# Patient Record
Sex: FEMALE | Race: OTHER | NOT HISPANIC OR LATINO | URBAN - METROPOLITAN AREA
[De-identification: names, ages, dates, MRNs, and addresses within clinical notes are randomized per-mention and may not be internally consistent; named-entity substitution may affect disease eponyms.]

---

## 2017-02-26 ENCOUNTER — EMERGENCY (EMERGENCY)
Facility: HOSPITAL | Age: 32
LOS: 1 days | Discharge: HOME | End: 2017-02-26
Admitting: EMERGENCY MEDICINE
Payer: COMMERCIAL

## 2017-02-26 PROCEDURE — 99284 EMERGENCY DEPT VISIT MOD MDM: CPT | Mod: 25

## 2017-02-28 PROCEDURE — 96375 TX/PRO/DX INJ NEW DRUG ADDON: CPT

## 2017-02-28 PROCEDURE — 83690 ASSAY OF LIPASE: CPT

## 2017-02-28 PROCEDURE — 85027 COMPLETE CBC AUTOMATED: CPT

## 2017-02-28 PROCEDURE — 96361 HYDRATE IV INFUSION ADD-ON: CPT

## 2017-02-28 PROCEDURE — 81003 URINALYSIS AUTO W/O SCOPE: CPT

## 2017-02-28 PROCEDURE — 81025 URINE PREGNANCY TEST: CPT

## 2017-02-28 PROCEDURE — 96374 THER/PROPH/DIAG INJ IV PUSH: CPT

## 2017-02-28 PROCEDURE — 80053 COMPREHEN METABOLIC PANEL: CPT

## 2017-02-28 PROCEDURE — 99284 EMERGENCY DEPT VISIT MOD MDM: CPT | Mod: 25

## 2017-03-07 ENCOUNTER — APPOINTMENT (OUTPATIENT)
Dept: OBGYN | Facility: CLINIC | Age: 32
End: 2017-03-07

## 2017-03-07 VITALS
BODY MASS INDEX: 18.61 KG/M2 | DIASTOLIC BLOOD PRESSURE: 72 MMHG | WEIGHT: 109 LBS | HEIGHT: 64 IN | SYSTOLIC BLOOD PRESSURE: 120 MMHG

## 2017-03-10 LAB — BACTERIA UR CULT: NORMAL

## 2017-05-01 ENCOUNTER — APPOINTMENT (OUTPATIENT)
Dept: PSYCHIATRY | Facility: CLINIC | Age: 32
End: 2017-05-01

## 2017-05-01 DIAGNOSIS — G43.909 MIGRAINE, UNSPECIFIED, NOT INTRACTABLE, W/OUT STATUS MIGRAINOSUS: ICD-10-CM

## 2017-05-21 ENCOUNTER — RX RENEWAL (OUTPATIENT)
Age: 32
End: 2017-05-21

## 2017-06-06 ENCOUNTER — APPOINTMENT (OUTPATIENT)
Dept: OBGYN | Facility: CLINIC | Age: 32
End: 2017-06-06

## 2017-06-06 VITALS
BODY MASS INDEX: 19.63 KG/M2 | HEIGHT: 64 IN | WEIGHT: 115 LBS | SYSTOLIC BLOOD PRESSURE: 120 MMHG | DIASTOLIC BLOOD PRESSURE: 82 MMHG

## 2017-06-06 DIAGNOSIS — B37.3 CANDIDIASIS OF VULVA AND VAGINA: ICD-10-CM

## 2017-06-06 DIAGNOSIS — Z87.898 PERSONAL HISTORY OF OTHER SPECIFIED CONDITIONS: ICD-10-CM

## 2017-06-06 DIAGNOSIS — N64.4 MASTODYNIA: ICD-10-CM

## 2017-06-06 DIAGNOSIS — K62.89 OTHER SPECIFIED DISEASES OF ANUS AND RECTUM: ICD-10-CM

## 2017-06-06 DIAGNOSIS — Z30.9 ENCOUNTER FOR CONTRACEPTIVE MANAGEMENT, UNSPECIFIED: ICD-10-CM

## 2017-06-08 PROBLEM — B37.3 YEAST VAGINITIS: Status: RESOLVED | Noted: 2017-03-07 | Resolved: 2017-06-08

## 2017-06-08 PROBLEM — K62.89 RECTAL BURNING: Status: RESOLVED | Noted: 2017-03-07 | Resolved: 2017-06-08

## 2017-06-08 LAB — HPV HIGH+LOW RISK DNA PNL CVX: NEGATIVE

## 2017-06-08 RX ORDER — TERCONAZOLE 8 MG/G
0.8 CREAM VAGINAL
Qty: 3 | Refills: 0 | Status: DISCONTINUED | COMMUNITY
Start: 2017-03-07 | End: 2017-06-08

## 2017-06-08 RX ORDER — HYDROCORTISONE ACETATE 1 %
50 OINTMENT (GRAM) RECTAL
Qty: 1 | Refills: 0 | Status: DISCONTINUED | COMMUNITY
Start: 2017-03-07 | End: 2017-06-08

## 2017-06-08 RX ORDER — VENLAFAXINE HYDROCHLORIDE 37.5 MG/1
37.5 CAPSULE, EXTENDED RELEASE ORAL DAILY
Qty: 30 | Refills: 2 | Status: DISCONTINUED | COMMUNITY
Start: 2017-05-01 | End: 2017-06-08

## 2017-06-08 RX ORDER — NITROFURANTOIN (MONOHYDRATE/MACROCRYSTALS) 25; 75 MG/1; MG/1
100 CAPSULE ORAL
Qty: 14 | Refills: 0 | Status: DISCONTINUED | COMMUNITY
Start: 2017-03-07 | End: 2017-06-08

## 2017-06-10 LAB — CYTOLOGY CVX/VAG DOC THIN PREP: NORMAL

## 2017-12-12 ENCOUNTER — APPOINTMENT (OUTPATIENT)
Dept: OBGYN | Facility: CLINIC | Age: 32
End: 2017-12-12
Payer: COMMERCIAL

## 2017-12-12 VITALS
BODY MASS INDEX: 21 KG/M2 | DIASTOLIC BLOOD PRESSURE: 72 MMHG | HEIGHT: 64 IN | SYSTOLIC BLOOD PRESSURE: 108 MMHG | WEIGHT: 123 LBS

## 2017-12-12 PROCEDURE — 99213 OFFICE O/P EST LOW 20 MIN: CPT

## 2018-06-05 ENCOUNTER — APPOINTMENT (OUTPATIENT)
Dept: OBGYN | Facility: CLINIC | Age: 33
End: 2018-06-05
Payer: COMMERCIAL

## 2018-06-05 VITALS
SYSTOLIC BLOOD PRESSURE: 124 MMHG | HEIGHT: 64 IN | DIASTOLIC BLOOD PRESSURE: 84 MMHG | WEIGHT: 118 LBS | BODY MASS INDEX: 20.14 KG/M2

## 2018-06-05 PROCEDURE — 99395 PREV VISIT EST AGE 18-39: CPT

## 2018-06-06 LAB — HPV HIGH+LOW RISK DNA PNL CVX: NOT DETECTED

## 2018-06-11 LAB — CYTOLOGY CVX/VAG DOC THIN PREP: NORMAL

## 2018-06-19 ENCOUNTER — RX RENEWAL (OUTPATIENT)
Age: 33
End: 2018-06-19

## 2018-07-18 ENCOUNTER — TRANSCRIPTION ENCOUNTER (OUTPATIENT)
Age: 33
End: 2018-07-18

## 2018-11-19 ENCOUNTER — RX RENEWAL (OUTPATIENT)
Age: 33
End: 2018-11-19

## 2019-02-07 ENCOUNTER — TRANSCRIPTION ENCOUNTER (OUTPATIENT)
Age: 34
End: 2019-02-07

## 2019-02-07 RX ORDER — VITAMIN C, CALCIUM, IRON, VITAMIN D3, VITAMIN E, THIAMIN, RIBOFLAVIN, NIACINAMIDE, VITAMIN B6, FOLIC ACID, IODINE, ZINC, COPPER, DOCUSATE SODIUM
27-1 & 250 KIT
Qty: 1 | Refills: 11 | Status: DISCONTINUED | COMMUNITY
Start: 2018-06-05 | End: 2019-02-07

## 2019-03-05 ENCOUNTER — APPOINTMENT (OUTPATIENT)
Dept: OBGYN | Facility: CLINIC | Age: 34
End: 2019-03-05
Payer: COMMERCIAL

## 2019-03-05 VITALS
WEIGHT: 115 LBS | DIASTOLIC BLOOD PRESSURE: 58 MMHG | BODY MASS INDEX: 19.63 KG/M2 | HEIGHT: 64 IN | SYSTOLIC BLOOD PRESSURE: 124 MMHG

## 2019-03-05 LAB
BILIRUB UR QL STRIP: NORMAL
GLUCOSE UR-MCNC: NORMAL
HCG UR QL: 0.2 EU/DL
HCG UR QL: POSITIVE
HGB UR QL STRIP.AUTO: NORMAL
KETONES UR-MCNC: NORMAL
LEUKOCYTE ESTERASE UR QL STRIP: NORMAL
NITRITE UR QL STRIP: NORMAL
PH UR STRIP: 7
PROT UR STRIP-MCNC: NORMAL
QUALITY CONTROL: YES
SP GR UR STRIP: 1.01

## 2019-03-05 PROCEDURE — 76817 TRANSVAGINAL US OBSTETRIC: CPT

## 2019-03-05 PROCEDURE — 99213 OFFICE O/P EST LOW 20 MIN: CPT | Mod: 25

## 2019-03-05 PROCEDURE — 36415 COLL VENOUS BLD VENIPUNCTURE: CPT

## 2019-03-05 PROCEDURE — 81025 URINE PREGNANCY TEST: CPT

## 2019-03-05 RX ORDER — PANTOPRAZOLE 40 MG/1
40 TABLET, DELAYED RELEASE ORAL DAILY
Qty: 30 | Refills: 0 | Status: DISCONTINUED | COMMUNITY
Start: 2017-06-08 | End: 2019-03-05

## 2019-03-05 RX ORDER — VENLAFAXINE HYDROCHLORIDE 75 MG/1
75 CAPSULE, EXTENDED RELEASE ORAL DAILY
Qty: 30 | Refills: 2 | Status: DISCONTINUED | COMMUNITY
Start: 2017-05-01 | End: 2019-03-05

## 2019-03-07 LAB
ABO + RH PNL BLD: NORMAL
BACTERIA UR CULT: NORMAL
BLD GP AB SCN SERPL QL: NORMAL
C TRACH RRNA SPEC QL NAA+PROBE: NOT DETECTED
HCG SERPL-MCNC: 1952 MIU/ML
N GONORRHOEA RRNA SPEC QL NAA+PROBE: NOT DETECTED
PROGEST SERPL-MCNC: 0.9 NG/ML
SOURCE AMPLIFICATION: NORMAL
SOURCE AMPLIFICATION: NORMAL
T VAGINALIS RRNA SPEC QL NAA+PROBE: NOT DETECTED

## 2019-03-08 LAB — HCG SERPL-MCNC: 753 MIU/ML

## 2019-03-09 ENCOUNTER — TRANSCRIPTION ENCOUNTER (OUTPATIENT)
Age: 34
End: 2019-03-09

## 2019-03-11 ENCOUNTER — APPOINTMENT (OUTPATIENT)
Dept: OBGYN | Facility: CLINIC | Age: 34
End: 2019-03-11

## 2019-03-11 ENCOUNTER — APPOINTMENT (OUTPATIENT)
Dept: OBGYN | Facility: CLINIC | Age: 34
End: 2019-03-11
Payer: COMMERCIAL

## 2019-03-11 VITALS
SYSTOLIC BLOOD PRESSURE: 100 MMHG | WEIGHT: 114 LBS | BODY MASS INDEX: 19.46 KG/M2 | HEIGHT: 64 IN | DIASTOLIC BLOOD PRESSURE: 50 MMHG

## 2019-03-11 LAB
BILIRUB UR QL STRIP: NORMAL
GLUCOSE UR-MCNC: NORMAL
HCG UR QL: 0.2 EU/DL
HGB UR QL STRIP.AUTO: NORMAL
KETONES UR-MCNC: NORMAL
LEUKOCYTE ESTERASE UR QL STRIP: NORMAL
NITRITE UR QL STRIP: NORMAL
PH UR STRIP: 7.5
PROT UR STRIP-MCNC: NORMAL
SP GR UR STRIP: 1.01

## 2019-03-11 PROCEDURE — 76817 TRANSVAGINAL US OBSTETRIC: CPT

## 2019-03-12 LAB — HCG SERPL-MCNC: 187 MIU/ML

## 2019-03-12 NOTE — REVIEW OF SYSTEMS
[Pelvic Pain] : pelvic pain [Abn Vag Bleeding] : abnormal vaginal bleeding [Nl] : Hematologic/Lymphatic

## 2019-03-12 NOTE — PHYSICAL EXAM
Patient Education     * Viral Conjunctivitis (Child)  Viral conjunctivitis (sometimes called pink eye) is a common infection of the eye. It is very contagious. The most common symptoms include redness, discharge from the eye, swollen eyelids, and a gritty or scratchy feeling in the eye.  Viral conjunctivitis is caused by a virus. It may be treated with medicine. Viral conjunctivitis is very contagious. Touching the infected eye, then touching another person passes this infection. It can also be spread from one eye to the other in this same way. Children with this illness may go to school, as long as they are not feeling ill and can avoid close contact with others.  Home care  Your child s healthcare provider may prescribe eye drops or an ointment. These may or may not contain antiviral medicine to treat the infection. You may also be told to use artificial tears to help soothe the irritation. Follow all instructions when using these medicines.  To give eye medicine to a child  1.   Wash your hands well with soap and warm water.  2. Remove any drainage from your child s eye with a clean tissue. Wipe from the nose toward the ear, to keep the eye as clean as possible.  3. To remove eye crusts, wet a washcloth with warm water and place it over the eye. Wait about 1 minute. Gently wipe the eye from the nose outward with the washcloth. Do this until the eye is clear. Important: If both eyes need cleaning, use a separate cloth for each eye.  4. Have your child lie down on a flat surface. A rolled-up towel or pillow may be placed under the neck so that the head is tilted back. Gently hold your child s head, if needed.  5. Using eye drops: Apply drops in the corner of the eye where the eyelid meets the nose. The drops will pool in this area. When your child blinks or opens his or her lids, the drops will flow into the eye. Give the exact number of drops prescribed. Be careful not to touch the eye or eyelashes with the  dropper.  6. Using ointment: If both drops and ointment are prescribed, give the drops first. Wait 3 minutes, and then apply the ointment. Doing this will give each medicine time to work. To apply the ointment, start by gently pulling down the lower lid. Place a thin line of ointment along the inside of the lid. Begin at the nose and move outward. Close the lid. Wipe away excess ointment from the nose area outward. This is to keep the eyes as clean as possible. Have your child keep the eye closed for 1 or 2 minutes so the medication has time to coat the eye. Eye ointment may cause blurry vision. This is normal. Apply ointment right before your child goes to sleep. In infants, ointment may be easier to apply while your child is sleeping.  7. Wash your hands well with soap and warm water again. This is to help prevent the infection from spreading.  General care    Apply a damp, cool washcloth to the eye as needed to help ease pain and irritation.    Make sure your child doesn t rub his or her eyes.    Shield your child s eyes when in direct sunlight to avoid irritation.  Follow-up care  Follow up with your child s healthcare provider, or as advised.  Special note to parents  To avoid spreading the infection, wash your hands well with soap and warm water before and after touching your child s eyes. Have your child wash his or her hands often. Make sure your child doesn t touch his or her eyes. Dispose of all tissues. Launder washcloths after each use. Don t let your child share towels, bedding, or clothes with anyone.  When to seek medical advice  Unless your child's healthcare provider advises otherwise, call the provider right away if any of these occur:    Your child is 3 months old or younger and has a fever of 100.4 F (38 C) or higher. Get medical care right away. Fever in a young baby can be a sign of a dangerous infection.    Your child is younger than 2 years of age and has a fever of 100.4 F (38 C) that  continues for more than 1 day    Your child is 2 years old or older and has a fever of 100.4 F (38 C) that continues for more than 3 days    Your child is of any age and has repeated fevers above 104 F (40 C)    Your child has vision changes, such as trouble seeing    Your child shows signs of the infection getting worse, such as more warmth, redness, swelling, or fluid leaking from the eye    Your child s pain gets worse. Babies may show pain as crying or fussing that can t be soothed    Swelling and redness don t get better with treatment  Call 911  Call 911 if your child has any of these:    Trouble breathing    Confusion    Extreme drowsiness or trouble awakening    Fainting or loss of consciousness    Rapid heart rate    Seizure    Stiff neck  Date Last Reviewed: 6/15/2015    [id number] [employer]. [address] [place] [id number]. All rights reserved. This information is not intended as a substitute for professional medical care. Always follow your healthcare professional's instructions.           Patient Education     Viral Upper Respiratory Illness (Child)  Your child has a viral upper respiratory illness (URI), which is another term for the common cold. The virus is contagious during the first few days. It is spread through the air by coughing, sneezing, or by direct contact (touching your sick child then touching your own eyes, nose, or mouth). Frequent handwashing will decrease risk of spread. Most viral illnesses resolve within 7 to 14 days with rest and simple home remedies. However, they may sometimes last up to 4 weeks. Antibiotics will not kill a virus and are generally not prescribed for this condition.    Home care    Fluids. Fever increases water loss from the body. Encourage your child to drink lots of fluids to loosen lung secretions and make it easier to breathe.   ? For infants under 1 year old, continue regular formula or breast feedings. Between feedings, give oral  rehydration solution. This is available from drugstores and grocery stores without a prescription.  ?  For children over 1 year old, give plenty of fluids, such as water, juice, gelatin water, soda without caffeine, ginger ale, lemonade, or ice pops.    Eating. If your child doesn't want to eat solid foods, it's OK for a few days, as long as he or she drinks lots of fluid.    Rest. Keep children with fever at home resting or playing quietly until the fever is gone. Encourage frequent naps. Your child may return to day care or school when the fever is gone and he or she is eating well, does not tire easily, and is feeling better.    Sleep. Periods of sleeplessness and irritability are common. A congested child will sleep best with the head and upper body propped up on pillows or with the head of the bed frame raised on a 6-inch block.     Cough. Coughing is a normal part of this illness. A cool mist humidifier at the bedside may be helpful. Be sure to clean the humidifier every day to prevent mold. Over-the-counter cough and cold medicines have not proved to be any more helpful than a placebo (syrup with no medicine in it). In addition, these medicines can produce serious side effects, especially in infants under 2 years of age. Don't give over-the-counter cough and cold medicines to children under 6 years unless your healthcare provider has specifically advised you to do so.  ? Don t expose your child to cigarette smoke. It can make the cough worse. Don't let anyone smoke in your house or car.    Nasal congestion. Suction the nose of infants with a bulb syringe. You may put 2 to 3 drops of saltwater (saline) nose drops in each nostril before suctioning. This helps thin and remove secretions. Saline nose drops are available without a prescription. You can also use 1/4 teaspoon of table salt dissolved in 1 cup of water.    Fever. Use children s acetaminophen for fever, fussiness, or discomfort, unless another medicine  was prescribed. In infants over 6 months of age, you may use children s ibuprofen or acetaminophen. If your child has chronic liver or kidney disease or has ever had a stomach ulcer or gastrointestinal bleeding, talk with your healthcare provider before using these medicines. Aspirin should never be given to anyone younger than 18 years of age who is ill with a viral infection or fever. It may cause severe liver or brain damage.    Preventing spread. Washing your hands before and after touching your sick child will help prevent a new infection. It will also help prevent the spread of this viral illness to yourself and other children. In an age appropriate manner, teach your children when, how, and why to wash their hands. Role model correct hand washing and encourage adults in your home to wash hands frequently.  Follow-up care  Follow up with your healthcare provider, or as advised.  When to seek medical advice  For a usually healthy child, call your child's healthcare provider right away if any of these occur:    A fever (see Fever and children, below)    Earache, sinus pain, stiff or painful neck, headache, repeated diarrhea, or vomiting.    Unusual fussiness.    A new rash appears.    Your child is dehydrated, with one or more of these symptoms:  ? No tears when crying.  ?  Sunken  eyes or a dry mouth.  ? No wet diapers for 8 hours in infants.  ? Reduced urine output in older children.    Your child has new symptoms or you are worried or confused by your child's condition.  Call 911  Call 911 if any of these occur:    Increased wheezing or difficulty breathing    Unusual drowsiness or confusion    Fast breathing:  ? Birth to 6 weeks: over 60 breaths per minute  ? 6 weeks to 2 years: over 45 breaths per minute  ? 3 to 6 years: over 35 breaths per minute  ? 7 to 10 years: over 30 breaths per minute  ? Older than 10 years: over 25 breaths per minute  Fever and children  Always use a digital thermometer to check  [Normal] : uterus your child s temperature. Never use a mercury thermometer.  For infants and toddlers, be sure to use a rectal thermometer correctly. A rectal thermometer may accidentally poke a hole in (perforate) the rectum. It may also pass on germs from the stool. Always follow the product maker s directions for proper use. If you don t feel comfortable taking a rectal temperature, use another method. When you talk to your child s healthcare provider, tell him or her which method you used to take your child s temperature.  Here are guidelines for fever temperature. Ear temperatures aren t accurate before 6 months of age. Don t take an oral temperature until your child is at least 4 years old.  Infant under 3 months old:    Ask your child s healthcare provider how you should take the temperature.    Rectal or forehead (temporal artery) temperature of 100.4 F (38 C) or higher, or as directed by the provider    Armpit temperature of 99 F (37.2 C) or higher, or as directed by the provider  Child age 3 to 36 months:    Rectal, forehead (temporal artery), or ear temperature of 102 F (38.9 C) or higher, or as directed by the provider    Armpit temperature of 101 F (38.3 C) or higher, or as directed by the provider  Child of any age:    Repeated temperature of 104 F (40 C) or higher, or as directed by the provider    Fever that lasts more than 24 hours in a child under 2 years old. Or a fever that lasts for 3 days in a child 2 years or older.   Date Last Reviewed: 2018 2000-2018 [employer]. [address] [place] [address], [place] [address]. All rights reserved. This information is not intended as a substitute for professional medical care. Always follow your healthcare professional's instructions.           Patient Education     Treating Viral Respiratory Illness in Children  Viral respiratory illnesses include colds, the flu, and RSV (respiratory syncytial virus). Treatment will focus on relieving your child s symptoms  [No Bleeding] : there was no active vaginal bleeding and ensuring that the infection does not get worse. Antibiotics are not effective against viruses. Always see your child s healthcare provider if your child has trouble breathing.    Helping your child feel better    Give your child plenty of fluids, such as water or apple juice.    Make sure your child gets plenty of rest.    Keep your infant s nose clear. Use a rubber bulb suction device to remove mucus as needed. Don't be aggressive when suctioning. This may cause more swelling and discomfort.    Raise the head of your child's bed slightly to make breathing easier.    Run a cool-mist humidifier or vaporizer in your child s room to keep the air moist and nasal passages clear.    Don't let anyone smoke near your child.    Treat your child s fever with acetaminophen. In infants 6 months or older, you may use ibuprofen instead to help reduce the fever. Never give aspirin to a child under age 18. It could cause a rare but serious condition called Reye syndrome.  When to seek medical care  Most children get over colds and flu on their own in time, with rest and care from you. Call your child's healthcare provider if your child:    Has a fever of 100.4 F (38 C) in a baby younger than 3 months    Has a repeated fever of 104 F (40 C) or higher    Has nausea or vomiting, or can t keep even small amounts of liquid down    Hasn t urinated for 6 hours or more, or has dark or strong-smelling urine    Has a harsh cough, a cough that doesn't get better, wheezing, or trouble breathing    Has bad or increasing pain    Develops a skin rash    Is very tired or lethargic    Develops a blue color to the skin around the lips or on the fingers or toes  Date Last Reviewed: 1/1/2017 2000-2018 [employer]. [address] [place] [address], [place] [address]. All rights reserved. This information is not intended as a substitute for professional medical care. Always follow your healthcare professional's instructions.            [Uterine Adnexae] : were not tender and not enlarged

## 2019-03-26 LAB — HCG SERPL-MCNC: 7 MIU/ML

## 2019-04-11 ENCOUNTER — APPOINTMENT (OUTPATIENT)
Dept: OBGYN | Facility: CLINIC | Age: 34
End: 2019-04-11
Payer: COMMERCIAL

## 2019-04-11 PROCEDURE — 99211 OFF/OP EST MAY X REQ PHY/QHP: CPT

## 2019-04-15 ENCOUNTER — APPOINTMENT (OUTPATIENT)
Dept: OBGYN | Facility: CLINIC | Age: 34
End: 2019-04-15
Payer: COMMERCIAL

## 2019-04-15 VITALS
DIASTOLIC BLOOD PRESSURE: 70 MMHG | HEIGHT: 63 IN | SYSTOLIC BLOOD PRESSURE: 110 MMHG | BODY MASS INDEX: 20.73 KG/M2 | WEIGHT: 117 LBS

## 2019-04-15 LAB — BACTERIA UR CULT: NORMAL

## 2019-04-15 PROCEDURE — 99213 OFFICE O/P EST LOW 20 MIN: CPT

## 2019-04-15 RX ORDER — MISOPROSTOL 200 UG/1
200 TABLET ORAL
Qty: 4 | Refills: 0 | Status: DISCONTINUED | COMMUNITY
Start: 2019-03-11 | End: 2019-04-15

## 2019-04-15 NOTE — PHYSICAL EXAM
[No Lesions] : no genitalia lesions [Normal] : uterus [Labia Minora] : labia minora [Discharge] : a  ~M vaginal discharge was present [Moderate] : moderate [Usman] : yellow [Thick] : thick [No Bleeding] : there was no active vaginal bleeding [Normal Position] : in a normal position [Uterine Adnexae] : were not tender and not enlarged [Foul Smelling] : not foul smelling [Adnexa Tenderness] : were not tender [Motion Tenderness] : there was no cervical motion tenderness [Ovarian Mass (___ Cm)] : there were no adnexal masses

## 2019-04-15 NOTE — HISTORY OF PRESENT ILLNESS
[Definite:  ___ (Date)] : the last menstrual period was [unfilled] [Normal Amount/Duration] : was of a normal amount and duration [Regular Cycle Intervals] : periods have been regular [Monogamous] : is monogamous [Sexually Active] : is sexually active [Spotting Between  Menses] : no spotting between menses

## 2019-04-15 NOTE — COUNSELING
[Breast Self Exam] : breast self exam [Nutrition] : nutrition [Exercise] : exercise [Vitamins/Supplements] : vitamins/supplements [STD (testing, results, tx)] : STD (testing, results, tx) [Vulvar Hygiene] : vulvar hygiene [Bladder Hygiene] : bladder hygiene

## 2019-04-16 LAB
CANDIDA VAG CYTO: NOT DETECTED
G VAGINALIS+PREV SP MTYP VAG QL MICRO: NOT DETECTED
T VAGINALIS VAG QL WET PREP: NOT DETECTED

## 2019-07-16 ENCOUNTER — RESULT CHARGE (OUTPATIENT)
Age: 34
End: 2019-07-16

## 2019-07-16 ENCOUNTER — APPOINTMENT (OUTPATIENT)
Dept: OBGYN | Facility: CLINIC | Age: 34
End: 2019-07-16
Payer: COMMERCIAL

## 2019-07-16 VITALS
DIASTOLIC BLOOD PRESSURE: 60 MMHG | SYSTOLIC BLOOD PRESSURE: 104 MMHG | BODY MASS INDEX: 20.38 KG/M2 | HEIGHT: 63 IN | WEIGHT: 115 LBS

## 2019-07-16 LAB
BILIRUB UR QL STRIP: NORMAL
GLUCOSE UR-MCNC: NORMAL
HCG UR QL: 0.2 EU/DL
HCG UR QL: POSITIVE
HGB UR QL STRIP.AUTO: NORMAL
KETONES UR-MCNC: NORMAL
LEUKOCYTE ESTERASE UR QL STRIP: NORMAL
NITRITE UR QL STRIP: NORMAL
PH UR STRIP: 6.5
PROT UR STRIP-MCNC: NORMAL
QUALITY CONTROL: YES
SP GR UR STRIP: 1.02

## 2019-07-16 PROCEDURE — 81003 URINALYSIS AUTO W/O SCOPE: CPT | Mod: QW

## 2019-07-16 PROCEDURE — 76817 TRANSVAGINAL US OBSTETRIC: CPT

## 2019-07-16 PROCEDURE — 81025 URINE PREGNANCY TEST: CPT

## 2019-07-16 PROCEDURE — 0501F PRENATAL FLOW SHEET: CPT

## 2019-07-18 ENCOUNTER — NON-APPOINTMENT (OUTPATIENT)
Age: 34
End: 2019-07-18

## 2019-07-18 ENCOUNTER — OTHER (OUTPATIENT)
Age: 34
End: 2019-07-18

## 2019-07-18 DIAGNOSIS — N89.8 OTHER SPECIFIED NONINFLAMMATORY DISORDERS OF VAGINA: ICD-10-CM

## 2019-07-18 DIAGNOSIS — O02.1 MISSED ABORTION: ICD-10-CM

## 2019-07-18 DIAGNOSIS — K21.9 GASTRO-ESOPHAGEAL REFLUX DISEASE W/OUT ESOPHAGITIS: ICD-10-CM

## 2019-07-18 DIAGNOSIS — Z32.01 ENCOUNTER FOR PREGNANCY TEST, RESULT POSITIVE: ICD-10-CM

## 2019-07-18 DIAGNOSIS — R39.9 UNSPECIFIED SYMPTOMS AND SIGNS INVOLVING THE GENITOURINARY SYSTEM: ICD-10-CM

## 2019-07-18 DIAGNOSIS — Z87.59 PERSONAL HISTORY OF OTHER COMPLICATIONS OF PREGNANCY, CHILDBIRTH AND THE PUERPERIUM: ICD-10-CM

## 2019-07-18 DIAGNOSIS — Z30.9 ENCOUNTER FOR CONTRACEPTIVE MANAGEMENT, UNSPECIFIED: ICD-10-CM

## 2019-07-18 DIAGNOSIS — B37.9 CANDIDIASIS, UNSPECIFIED: ICD-10-CM

## 2019-07-18 LAB
BACTERIA UR CULT: NORMAL
C TRACH RRNA SPEC QL NAA+PROBE: NOT DETECTED
N GONORRHOEA RRNA SPEC QL NAA+PROBE: NOT DETECTED
SOURCE AMPLIFICATION: NORMAL
SOURCE AMPLIFICATION: NORMAL
T VAGINALIS RRNA SPEC QL NAA+PROBE: NOT DETECTED

## 2019-07-18 RX ORDER — TERCONAZOLE 8 MG/G
0.8 CREAM VAGINAL
Qty: 1 | Refills: 0 | Status: DISCONTINUED | COMMUNITY
Start: 2019-04-05 | End: 2019-07-18

## 2019-08-06 ENCOUNTER — APPOINTMENT (OUTPATIENT)
Dept: OBGYN | Facility: CLINIC | Age: 34
End: 2019-08-06
Payer: COMMERCIAL

## 2019-08-06 VITALS
HEIGHT: 63 IN | WEIGHT: 118 LBS | BODY MASS INDEX: 20.91 KG/M2 | SYSTOLIC BLOOD PRESSURE: 110 MMHG | DIASTOLIC BLOOD PRESSURE: 60 MMHG

## 2019-08-06 LAB
BILIRUB UR QL STRIP: NORMAL
GLUCOSE UR-MCNC: NORMAL
HCG UR QL: 0.2 EU/DL
HGB UR QL STRIP.AUTO: NORMAL
KETONES UR-MCNC: NORMAL
LEUKOCYTE ESTERASE UR QL STRIP: NORMAL
NITRITE UR QL STRIP: NORMAL
PH UR STRIP: 5.5
PROT UR STRIP-MCNC: NORMAL
SP GR UR STRIP: 1.02

## 2019-08-06 PROCEDURE — 76801 OB US < 14 WKS SINGLE FETUS: CPT

## 2019-08-06 PROCEDURE — 0502F SUBSEQUENT PRENATAL CARE: CPT

## 2019-08-06 PROCEDURE — 36415 COLL VENOUS BLD VENIPUNCTURE: CPT

## 2019-08-07 LAB
ABO + RH PNL BLD: NORMAL
BASOPHILS # BLD AUTO: 0.01 K/UL
BASOPHILS NFR BLD AUTO: 0.1 %
BLD GP AB SCN SERPL QL: NORMAL
EOSINOPHIL # BLD AUTO: 0.05 K/UL
EOSINOPHIL NFR BLD AUTO: 0.5 %
HBV SURFACE AG SERPL QL IA: NONREACTIVE
HCT VFR BLD CALC: 36.2 %
HCV AB SER QL: NONREACTIVE
HCV S/CO RATIO: 0.12 S/CO
HGB BLD-MCNC: 12 G/DL
HIV1+2 AB SPEC QL IA.RAPID: NONREACTIVE
IMM GRANULOCYTES NFR BLD AUTO: 0.2 %
LYMPHOCYTES # BLD AUTO: 1.71 K/UL
LYMPHOCYTES NFR BLD AUTO: 18.6 %
MAN DIFF?: NORMAL
MCHC RBC-ENTMCNC: 30.8 PG
MCHC RBC-ENTMCNC: 33.1 GM/DL
MCV RBC AUTO: 93.1 FL
MONOCYTES # BLD AUTO: 0.48 K/UL
MONOCYTES NFR BLD AUTO: 5.2 %
NEUTROPHILS # BLD AUTO: 6.9 K/UL
NEUTROPHILS NFR BLD AUTO: 75.4 %
PLATELET # BLD AUTO: 264 K/UL
RBC # BLD: 3.89 M/UL
RBC # FLD: 12.6 %
TSH SERPL-ACNC: 0.85 UIU/ML
WBC # FLD AUTO: 9.17 K/UL

## 2019-08-08 ENCOUNTER — NON-APPOINTMENT (OUTPATIENT)
Age: 34
End: 2019-08-08

## 2019-08-08 LAB
B19V IGG SER QL IA: 6.3 INDEX
B19V IGG+IGM SER-IMP: NORMAL
B19V IGG+IGM SER-IMP: POSITIVE
B19V IGM FLD-ACNC: 0.2
B19V IGM SER-ACNC: NEGATIVE
HGB A MFR BLD: 97.4 %
HGB A2 MFR BLD: 2.6 %
HGB FRACT BLD-IMP: NORMAL
LEAD BLD-MCNC: <1 UG/DL
MEV IGG FLD QL IA: >300 AU/ML
MEV IGG+IGM SER-IMP: POSITIVE
RUBV IGG FLD-ACNC: 1.9 INDEX
RUBV IGG SER-IMP: POSITIVE
T PALLIDUM AB SER QL IA: NEGATIVE
VZV AB TITR SER: POSITIVE
VZV IGG SER IF-ACNC: 317.5 INDEX

## 2019-09-03 ENCOUNTER — APPOINTMENT (OUTPATIENT)
Dept: OBGYN | Facility: CLINIC | Age: 34
End: 2019-09-03
Payer: COMMERCIAL

## 2019-09-03 ENCOUNTER — NON-APPOINTMENT (OUTPATIENT)
Age: 34
End: 2019-09-03

## 2019-09-03 VITALS
WEIGHT: 119 LBS | DIASTOLIC BLOOD PRESSURE: 62 MMHG | HEIGHT: 63 IN | BODY MASS INDEX: 21.09 KG/M2 | SYSTOLIC BLOOD PRESSURE: 112 MMHG

## 2019-09-03 DIAGNOSIS — Z3A.12 12 WEEKS GESTATION OF PREGNANCY: ICD-10-CM

## 2019-09-03 LAB
BILIRUB UR QL STRIP: NORMAL
GLUCOSE UR-MCNC: NORMAL
HCG UR QL: 0.2 EU/DL
HGB UR QL STRIP.AUTO: NORMAL
KETONES UR-MCNC: NORMAL
LEUKOCYTE ESTERASE UR QL STRIP: NORMAL
NITRITE UR QL STRIP: NORMAL
PH UR STRIP: 6.5
PROT UR STRIP-MCNC: NORMAL
SP GR UR STRIP: 1.01

## 2019-09-03 PROCEDURE — 0502F SUBSEQUENT PRENATAL CARE: CPT

## 2019-09-03 PROCEDURE — 36415 COLL VENOUS BLD VENIPUNCTURE: CPT

## 2019-09-04 ENCOUNTER — NON-APPOINTMENT (OUTPATIENT)
Age: 34
End: 2019-09-04

## 2019-09-07 LAB
2ND TRIMESTER DATA: NORMAL
AFP PNL SERPL: NORMAL
AFP SERPL-ACNC: NORMAL
CLINICAL BIOCHEMIST REVIEW: NORMAL
Lab: NORMAL
NOTES NTD: NORMAL

## 2019-09-10 ENCOUNTER — TRANSCRIPTION ENCOUNTER (OUTPATIENT)
Age: 34
End: 2019-09-10

## 2019-10-01 ENCOUNTER — NON-APPOINTMENT (OUTPATIENT)
Age: 34
End: 2019-10-01

## 2019-10-01 ENCOUNTER — APPOINTMENT (OUTPATIENT)
Dept: OBGYN | Facility: CLINIC | Age: 34
End: 2019-10-01
Payer: COMMERCIAL

## 2019-10-01 VITALS
HEIGHT: 63 IN | SYSTOLIC BLOOD PRESSURE: 110 MMHG | DIASTOLIC BLOOD PRESSURE: 60 MMHG | BODY MASS INDEX: 21.44 KG/M2 | WEIGHT: 121 LBS

## 2019-10-01 DIAGNOSIS — Z3A.15 15 WEEKS GESTATION OF PREGNANCY: ICD-10-CM

## 2019-10-01 LAB
BILIRUB UR QL STRIP: NORMAL
GLUCOSE UR-MCNC: NORMAL
HCG UR QL: 0.2 EU/DL
HGB UR QL STRIP.AUTO: NORMAL
KETONES UR-MCNC: NORMAL
LEUKOCYTE ESTERASE UR QL STRIP: NORMAL
NITRITE UR QL STRIP: NORMAL
PH UR STRIP: 7
PROT UR STRIP-MCNC: NORMAL
SP GR UR STRIP: 1.01

## 2019-10-01 PROCEDURE — 0502F SUBSEQUENT PRENATAL CARE: CPT

## 2019-10-14 ENCOUNTER — APPOINTMENT (OUTPATIENT)
Dept: ANTEPARTUM | Facility: CLINIC | Age: 34
End: 2019-10-14
Payer: COMMERCIAL

## 2019-10-14 ENCOUNTER — ASOB RESULT (OUTPATIENT)
Age: 34
End: 2019-10-14

## 2019-10-14 PROCEDURE — 76811 OB US DETAILED SNGL FETUS: CPT

## 2019-10-29 ENCOUNTER — NON-APPOINTMENT (OUTPATIENT)
Age: 34
End: 2019-10-29

## 2019-10-29 ENCOUNTER — APPOINTMENT (OUTPATIENT)
Dept: OBGYN | Facility: CLINIC | Age: 34
End: 2019-10-29
Payer: COMMERCIAL

## 2019-10-29 VITALS
DIASTOLIC BLOOD PRESSURE: 68 MMHG | BODY MASS INDEX: 23.04 KG/M2 | SYSTOLIC BLOOD PRESSURE: 136 MMHG | WEIGHT: 130 LBS | HEIGHT: 63 IN

## 2019-10-29 LAB
BILIRUB UR QL STRIP: NORMAL
GLUCOSE UR-MCNC: NORMAL
HCG UR QL: 1 EU/DL
HGB UR QL STRIP.AUTO: NORMAL
KETONES UR-MCNC: NORMAL
LEUKOCYTE ESTERASE UR QL STRIP: NORMAL
NITRITE UR QL STRIP: NORMAL
PH UR STRIP: 7.5
PROT UR STRIP-MCNC: NORMAL
SP GR UR STRIP: 1.01

## 2019-10-29 PROCEDURE — 90656 IIV3 VACC NO PRSV 0.5 ML IM: CPT

## 2019-10-29 PROCEDURE — 90471 IMMUNIZATION ADMIN: CPT

## 2019-10-29 PROCEDURE — 0502F SUBSEQUENT PRENATAL CARE: CPT

## 2019-11-01 LAB — BACTERIA UR CULT: NORMAL

## 2019-11-29 ENCOUNTER — NON-APPOINTMENT (OUTPATIENT)
Age: 34
End: 2019-11-29

## 2019-11-29 ENCOUNTER — APPOINTMENT (OUTPATIENT)
Dept: OBGYN | Facility: CLINIC | Age: 34
End: 2019-11-29
Payer: COMMERCIAL

## 2019-11-29 VITALS
BODY MASS INDEX: 23.57 KG/M2 | WEIGHT: 133 LBS | SYSTOLIC BLOOD PRESSURE: 112 MMHG | HEIGHT: 63 IN | DIASTOLIC BLOOD PRESSURE: 58 MMHG

## 2019-11-29 PROCEDURE — 0502F SUBSEQUENT PRENATAL CARE: CPT

## 2019-11-29 PROCEDURE — 36415 COLL VENOUS BLD VENIPUNCTURE: CPT

## 2019-11-29 PROCEDURE — 90471 IMMUNIZATION ADMIN: CPT

## 2019-11-29 PROCEDURE — 90656 IIV3 VACC NO PRSV 0.5 ML IM: CPT

## 2019-12-01 LAB
BASOPHILS # BLD AUTO: 0.02 K/UL
BASOPHILS NFR BLD AUTO: 0.2 %
BILIRUB UR QL STRIP: NORMAL
EOSINOPHIL # BLD AUTO: 0.03 K/UL
EOSINOPHIL NFR BLD AUTO: 0.3 %
GLUCOSE 1H P 50 G GLC PO SERPL-MCNC: 129 MG/DL
GLUCOSE UR-MCNC: NORMAL
HCG UR QL: 0.2 EU/DL
HCT VFR BLD CALC: 28.9 %
HGB BLD-MCNC: 10 G/DL
HGB UR QL STRIP.AUTO: NORMAL
IMM GRANULOCYTES NFR BLD AUTO: 0.3 %
KETONES UR-MCNC: NORMAL
LEUKOCYTE ESTERASE UR QL STRIP: NORMAL
LYMPHOCYTES # BLD AUTO: 1.55 K/UL
LYMPHOCYTES NFR BLD AUTO: 13.4 %
MAN DIFF?: NORMAL
MCHC RBC-ENTMCNC: 32.2 PG
MCHC RBC-ENTMCNC: 34.6 GM/DL
MCV RBC AUTO: 92.9 FL
MONOCYTES # BLD AUTO: 0.58 K/UL
MONOCYTES NFR BLD AUTO: 5 %
NEUTROPHILS # BLD AUTO: 9.34 K/UL
NEUTROPHILS NFR BLD AUTO: 80.8 %
NITRITE UR QL STRIP: NORMAL
PH UR STRIP: 7
PLATELET # BLD AUTO: 213 K/UL
PROT UR STRIP-MCNC: NORMAL
RBC # BLD: 3.11 M/UL
RBC # FLD: 13.3 %
SP GR UR STRIP: 1.01
T PALLIDUM AB SER QL IA: NEGATIVE
WBC # FLD AUTO: 11.56 K/UL

## 2019-12-09 ENCOUNTER — ASOB RESULT (OUTPATIENT)
Age: 34
End: 2019-12-09

## 2019-12-09 ENCOUNTER — APPOINTMENT (OUTPATIENT)
Dept: ANTEPARTUM | Facility: CLINIC | Age: 34
End: 2019-12-09
Payer: COMMERCIAL

## 2019-12-09 PROCEDURE — 76816 OB US FOLLOW-UP PER FETUS: CPT

## 2019-12-11 DIAGNOSIS — O44.40 LOW LYING PLACENTA NOS OR WITHOUT HEMORRHAGE, UNSPECIFIED TRIMESTER: ICD-10-CM

## 2019-12-13 PROBLEM — O44.40 LOW LYING PLACENTA, ANTEPARTUM: Status: RESOLVED | Noted: 2019-11-29 | Resolved: 2019-12-13

## 2019-12-14 ENCOUNTER — FORM ENCOUNTER (OUTPATIENT)
Age: 34
End: 2019-12-14

## 2019-12-15 ENCOUNTER — OUTPATIENT (OUTPATIENT)
Dept: INPATIENT UNIT | Facility: HOSPITAL | Age: 34
LOS: 1 days | Discharge: HOME | End: 2019-12-15
Payer: COMMERCIAL

## 2019-12-15 VITALS
SYSTOLIC BLOOD PRESSURE: 107 MMHG | HEART RATE: 104 BPM | RESPIRATION RATE: 20 BRPM | DIASTOLIC BLOOD PRESSURE: 70 MMHG | TEMPERATURE: 98 F

## 2019-12-15 VITALS — OXYGEN SATURATION: 99 %

## 2019-12-15 DIAGNOSIS — O26.899 OTHER SPECIFIED PREGNANCY RELATED CONDITIONS, UNSPECIFIED TRIMESTER: ICD-10-CM

## 2019-12-15 DIAGNOSIS — K40.21 BILATERAL INGUINAL HERNIA, WITHOUT OBSTRUCTION OR GANGRENE, RECURRENT: Chronic | ICD-10-CM

## 2019-12-15 DIAGNOSIS — K08.409 PARTIAL LOSS OF TEETH, UNSPECIFIED CAUSE, UNSPECIFIED CLASS: Chronic | ICD-10-CM

## 2019-12-15 DIAGNOSIS — Z90.89 ACQUIRED ABSENCE OF OTHER ORGANS: Chronic | ICD-10-CM

## 2019-12-15 DIAGNOSIS — Z3A.00 WEEKS OF GESTATION OF PREGNANCY NOT SPECIFIED: ICD-10-CM

## 2019-12-15 DIAGNOSIS — Z98.890 OTHER SPECIFIED POSTPROCEDURAL STATES: Chronic | ICD-10-CM

## 2019-12-15 LAB
ANION GAP SERPL CALC-SCNC: 9 MMO/L — SIGNIFICANT CHANGE UP (ref 7–14)
APPEARANCE UR: CLEAR — SIGNIFICANT CHANGE UP
B PERT DNA SPEC QL NAA+PROBE: NOT DETECTED — SIGNIFICANT CHANGE UP
BASOPHILS # BLD AUTO: 0.03 K/UL — SIGNIFICANT CHANGE UP (ref 0–0.2)
BASOPHILS NFR BLD AUTO: 0.3 % — SIGNIFICANT CHANGE UP (ref 0–2)
BILIRUB UR-MCNC: NEGATIVE — SIGNIFICANT CHANGE UP
BLOOD UR QL VISUAL: NEGATIVE — SIGNIFICANT CHANGE UP
BUN SERPL-MCNC: 5 MG/DL — LOW (ref 7–23)
C PNEUM DNA SPEC QL NAA+PROBE: NOT DETECTED — SIGNIFICANT CHANGE UP
CALCIUM SERPL-MCNC: 8.8 MG/DL — SIGNIFICANT CHANGE UP (ref 8.4–10.5)
CHLORIDE SERPL-SCNC: 105 MMOL/L — SIGNIFICANT CHANGE UP (ref 98–107)
CO2 SERPL-SCNC: 25 MMOL/L — SIGNIFICANT CHANGE UP (ref 22–31)
COLOR SPEC: YELLOW — SIGNIFICANT CHANGE UP
CREAT SERPL-MCNC: 0.48 MG/DL — LOW (ref 0.5–1.3)
EOSINOPHIL # BLD AUTO: 0.09 K/UL — SIGNIFICANT CHANGE UP (ref 0–0.5)
EOSINOPHIL NFR BLD AUTO: 0.8 % — SIGNIFICANT CHANGE UP (ref 0–6)
FLUAV H1 2009 PAND RNA SPEC QL NAA+PROBE: NOT DETECTED — SIGNIFICANT CHANGE UP
FLUAV H1 RNA SPEC QL NAA+PROBE: NOT DETECTED — SIGNIFICANT CHANGE UP
FLUAV H3 RNA SPEC QL NAA+PROBE: NOT DETECTED — SIGNIFICANT CHANGE UP
FLUAV SUBTYP SPEC NAA+PROBE: NOT DETECTED — SIGNIFICANT CHANGE UP
FLUBV RNA SPEC QL NAA+PROBE: NOT DETECTED — SIGNIFICANT CHANGE UP
GLUCOSE SERPL-MCNC: 82 MG/DL — SIGNIFICANT CHANGE UP (ref 70–99)
GLUCOSE UR-MCNC: NEGATIVE — SIGNIFICANT CHANGE UP
HADV DNA SPEC QL NAA+PROBE: NOT DETECTED — SIGNIFICANT CHANGE UP
HCOV PNL SPEC NAA+PROBE: SIGNIFICANT CHANGE UP
HCT VFR BLD CALC: 28.5 % — LOW (ref 34.5–45)
HGB BLD-MCNC: 10 G/DL — LOW (ref 11.5–15.5)
HMPV RNA SPEC QL NAA+PROBE: NOT DETECTED — SIGNIFICANT CHANGE UP
HPIV1 RNA SPEC QL NAA+PROBE: NOT DETECTED — SIGNIFICANT CHANGE UP
HPIV2 RNA SPEC QL NAA+PROBE: NOT DETECTED — SIGNIFICANT CHANGE UP
HPIV3 RNA SPEC QL NAA+PROBE: NOT DETECTED — SIGNIFICANT CHANGE UP
HPIV4 RNA SPEC QL NAA+PROBE: NOT DETECTED — SIGNIFICANT CHANGE UP
IMM GRANULOCYTES NFR BLD AUTO: 0.8 % — SIGNIFICANT CHANGE UP (ref 0–1.5)
KETONES UR-MCNC: NEGATIVE — SIGNIFICANT CHANGE UP
LEUKOCYTE ESTERASE UR-ACNC: NEGATIVE — SIGNIFICANT CHANGE UP
LYMPHOCYTES # BLD AUTO: 1.89 K/UL — SIGNIFICANT CHANGE UP (ref 1–3.3)
LYMPHOCYTES # BLD AUTO: 16.5 % — SIGNIFICANT CHANGE UP (ref 13–44)
MCHC RBC-ENTMCNC: 32.6 PG — SIGNIFICANT CHANGE UP (ref 27–34)
MCHC RBC-ENTMCNC: 35.1 % — SIGNIFICANT CHANGE UP (ref 32–36)
MCV RBC AUTO: 92.8 FL — SIGNIFICANT CHANGE UP (ref 80–100)
MONOCYTES # BLD AUTO: 0.78 K/UL — SIGNIFICANT CHANGE UP (ref 0–0.9)
MONOCYTES NFR BLD AUTO: 6.8 % — SIGNIFICANT CHANGE UP (ref 2–14)
NEUTROPHILS # BLD AUTO: 8.54 K/UL — HIGH (ref 1.8–7.4)
NEUTROPHILS NFR BLD AUTO: 74.8 % — SIGNIFICANT CHANGE UP (ref 43–77)
NITRITE UR-MCNC: NEGATIVE — SIGNIFICANT CHANGE UP
NRBC # FLD: 0 K/UL — SIGNIFICANT CHANGE UP (ref 0–0)
PH UR: 6.5 — SIGNIFICANT CHANGE UP (ref 5–8)
PLATELET # BLD AUTO: 185 K/UL — SIGNIFICANT CHANGE UP (ref 150–400)
PMV BLD: 9.3 FL — SIGNIFICANT CHANGE UP (ref 7–13)
POTASSIUM SERPL-MCNC: 3.4 MMOL/L — LOW (ref 3.5–5.3)
POTASSIUM SERPL-SCNC: 3.4 MMOL/L — LOW (ref 3.5–5.3)
PROT UR-MCNC: 10 — SIGNIFICANT CHANGE UP
RBC # BLD: 3.07 M/UL — LOW (ref 3.8–5.2)
RBC # FLD: 13.8 % — SIGNIFICANT CHANGE UP (ref 10.3–14.5)
RSV RNA SPEC QL NAA+PROBE: NOT DETECTED — SIGNIFICANT CHANGE UP
RV+EV RNA SPEC QL NAA+PROBE: NOT DETECTED — SIGNIFICANT CHANGE UP
SODIUM SERPL-SCNC: 139 MMOL/L — SIGNIFICANT CHANGE UP (ref 135–145)
SP GR SPEC: 1.01 — SIGNIFICANT CHANGE UP (ref 1–1.04)
UROBILINOGEN FLD QL: NORMAL — SIGNIFICANT CHANGE UP
WBC # BLD: 11.42 K/UL — HIGH (ref 3.8–10.5)
WBC # FLD AUTO: 11.42 K/UL — HIGH (ref 3.8–10.5)

## 2019-12-15 PROCEDURE — 71045 X-RAY EXAM CHEST 1 VIEW: CPT | Mod: 26

## 2019-12-15 PROCEDURE — 99214 OFFICE O/P EST MOD 30 MIN: CPT

## 2019-12-15 PROCEDURE — 59025 FETAL NON-STRESS TEST: CPT | Mod: 26

## 2019-12-15 RX ORDER — IPRATROPIUM/ALBUTEROL SULFATE 18-103MCG
3 AEROSOL WITH ADAPTER (GRAM) INHALATION ONCE
Refills: 0 | Status: COMPLETED | OUTPATIENT
Start: 2019-12-15 | End: 2019-12-15

## 2019-12-15 RX ORDER — FERROUS SULFATE 325(65) MG
0 TABLET ORAL
Qty: 0 | Refills: 0 | DISCHARGE

## 2019-12-15 RX ORDER — ACETAMINOPHEN 500 MG
975 TABLET ORAL ONCE
Refills: 0 | Status: COMPLETED | OUTPATIENT
Start: 2019-12-15 | End: 2019-12-15

## 2019-12-15 RX ORDER — IPRATROPIUM BROMIDE 0.2 MG/ML
500 SOLUTION, NON-ORAL INHALATION ONCE
Refills: 0 | Status: DISCONTINUED | OUTPATIENT
Start: 2019-12-15 | End: 2019-12-15

## 2019-12-15 RX ORDER — SODIUM CHLORIDE 9 MG/ML
1000 INJECTION, SOLUTION INTRAVENOUS ONCE
Refills: 0 | Status: DISCONTINUED | OUTPATIENT
Start: 2019-12-15 | End: 2019-12-30

## 2019-12-15 RX ORDER — ALBUTEROL 90 UG/1
2 AEROSOL, METERED ORAL
Qty: 1 | Refills: 0
Start: 2019-12-15 | End: 2019-12-24

## 2019-12-15 RX ORDER — AZITHROMYCIN 500 MG/1
250 TABLET, FILM COATED ORAL
Qty: 1250 | Refills: 0
Start: 2019-12-15 | End: 2019-12-19

## 2019-12-15 RX ADMIN — Medication 100 MILLIGRAM(S): at 14:20

## 2019-12-15 RX ADMIN — Medication 975 MILLIGRAM(S): at 14:17

## 2019-12-15 RX ADMIN — Medication 3 MILLILITER(S): at 15:53

## 2019-12-15 NOTE — OB PROVIDER TRIAGE NOTE - NSHPLABSRESULTS_GEN_ALL_CORE
CBC, BMP, U/A, RRP, sent  Chest X Ray    Respiratory consult for nebulizer treatment  As Dw Dr Richardson CBC, BMP, U/A, RRP, sent  Chest X Ray    Respiratory consult for nebulizer treatment  As Dw Dr Richardson    Chest x ray results  The heart is not enlarged. The trachea is not significantly deviated from   the midline. The mediastinum and na appear unremarkable.  The lungs are clear. No pleural effusion is seen.  No acute bony abnormality is noted. CBC, BMP, U/A, RRP, sent  Chest X Ray    Respiratory consult for nebulizer treatment  As Dw Dr Richardson    Chest x ray results  The heart is not enlarged. The trachea is not significantly deviated from   the midline. The mediastinum and na appear unremarkable.  The lungs are clear. No pleural effusion is seen.  No acute bony abnormality is noted.    12-15    139  |  105  |  5<L>  ----------------------------<  82  3.4<L>   |  25  |  0.48<L>    Ca    8.8      15 Dec 2019 13:47    Rapid Respiratory Viral Panel (12.15.19 @ 14:30)    Adenovirus (RapRVP): Not Detected    Influenza A (RapRVP): Not Detected    Influenza AH1 2009 (RapRVP): Not Detected    Influenza AH1 (RapRVP): Not Detected    Influenza AH3 (RapRVP): Not Detected    Influenza B (RapRVP): Not Detected    Parainfluenza 1 (RapRVP): Not Detected    Parainfluenza 2 (RapRVP): Not Detected    Parainfluenza 3 (RapRVP): Not Detected    Parainfluenza 4 (RapRVP): Not Detected    Resp Syncytial Virus (RapRVP): Not Detected    Chlamydia pneumoniae (RapRVP): Not Detected    Mycoplasma pneumoniae (RapRVP): Not Detected    Entero/Rhinovirus (RapRVP): Not Detected    hMPV (RapRVP): Not Detected    Coronavirus (229E,HKU1,NL63,OC43): Not Detected This Respiratory Panel uses polymerase chain reaction (PCR)  to detect for adenovirus; coronavirus (HKU1, NL63, 229E,  OC43); human metapneumovirus (hMPV); human  enterovirus/rhinovirus (Entero/RV); influenza A; influenza  A/H1: influenza A/H3; influenza A/H1-2009; influenza B;  parainfluenza viruses 1,2,3,4; respiratory syncytial virus;  Mycoplasma pneumoniae; and Chlamydophila pneumoniae.    Urinalysis Basic - ( 15 Dec 2019 13:47 )    Color: YELLOW / Appearance: CLEAR / S.014 / pH: 6.5  Gluc: NEGATIVE / Ketone: NEGATIVE  / Bili: NEGATIVE / Urobili: NORMAL   Blood: NEGATIVE / Protein: 10 / Nitrite: NEGATIVE   Leuk Esterase: NEGATIVE / RBC: x / WBC x   Sq Epi: x / Non Sq Epi: x / Bacteria: x    No acute findings

## 2019-12-15 NOTE — OB PROVIDER TRIAGE NOTE - NSHPPHYSICALEXAM_GEN_ALL_CORE
A/O x 3  Appears very uncomfortable with a non productive coughing episode observed  Lungs : B/L Wheezing on ausculation   ICU Vital Signs Last 24 Hrs  T(C): 36.4 (15 Dec 2019 12:12), Max: 36.4 (15 Dec 2019 12:12)  T(F): 97.5 (15 Dec 2019 12:12), Max: 97.5 (15 Dec 2019 12:12)  HR: 101 (15 Dec 2019 14:26) (91 - 112)  BP: 116/77 (15 Dec 2019 14:26) (101/64 - 116/77)  BP(mean): --  ABP: --  ABP(mean): --  RR: 20 (15 Dec 2019 12:12) (20 - 20)  SpO2: 100% (15 Dec 2019 14:25) (96% - 100%)  Abdomen is soft NT and gravid with no pain or ctx palpable  NST  in progress

## 2019-12-15 NOTE — OB PROVIDER TRIAGE NOTE - NSOBPROVIDERNOTE_OBGYN_ALL_OB_FT
35 yo @ 30 wks GA with URI   R/O Acute Pathology  R/O Viral vs Infectious URI  - 35 yo @ 30 wks GA with URI   R/O Acute Pathology  R/O Viral vs Infectious URI  -    No acute findings   S/P Labs, X Ray and Nebulizer treatment  A/P and lab findings DW  Dr Richarsdon will DC Home with albuterol inhaler and Z Pack  F/U with Dr Winslow   Return if s/s worsen and/or do not improve  For discharge

## 2019-12-15 NOTE — OB PROVIDER TRIAGE NOTE - ADDITIONAL INSTRUCTIONS
Labor precautions  Z Pack  Albuterol inhaler prn  Precautions   Return if s/s do not improve or worsen as DW Dr Richardson   PO hydration  Tylenol prn

## 2019-12-15 NOTE — OB PROVIDER TRIAGE NOTE - HISTORY OF PRESENT ILLNESS
33 yo @ 30 wks GA with c/o severe cough x 1 week. Patient states non productive but has upper abdominal pain from coughing . Denies fever, chills, N/V/D. C/O a Headache and sinusitis . Patient denies OB C/O at this time. Reports no VB, LOF, CTX, and reports good FM's. States tried Mucinex and Robitussin with no relief x 1 week   PNC: Dr Winslow

## 2019-12-15 NOTE — OB RN TRIAGE NOTE - PSH
Bilateral recurrent inguinal hernia without obstruction or gangrene  as infant  History of sinus surgery  5yrs ago  History of third molar tooth extraction, unspecified edentulism class  2011  History of tonsillectomy  age 9

## 2019-12-27 ENCOUNTER — APPOINTMENT (OUTPATIENT)
Dept: OBGYN | Facility: CLINIC | Age: 34
End: 2019-12-27
Payer: COMMERCIAL

## 2019-12-27 ENCOUNTER — NON-APPOINTMENT (OUTPATIENT)
Age: 34
End: 2019-12-27

## 2019-12-27 VITALS
SYSTOLIC BLOOD PRESSURE: 102 MMHG | WEIGHT: 134 LBS | HEIGHT: 63 IN | BODY MASS INDEX: 23.74 KG/M2 | DIASTOLIC BLOOD PRESSURE: 68 MMHG

## 2019-12-27 PROBLEM — J45.909 UNSPECIFIED ASTHMA, UNCOMPLICATED: Chronic | Status: ACTIVE | Noted: 2019-12-15

## 2019-12-27 PROBLEM — O03.9 COMPLETE OR UNSPECIFIED SPONTANEOUS ABORTION WITHOUT COMPLICATION: Chronic | Status: ACTIVE | Noted: 2019-12-15

## 2019-12-27 PROBLEM — D50.8 OTHER IRON DEFICIENCY ANEMIAS: Chronic | Status: ACTIVE | Noted: 2019-12-15

## 2019-12-27 LAB
BILIRUB UR QL STRIP: NORMAL
GLUCOSE UR-MCNC: NORMAL
HCG UR QL: 8 EU/DL
HGB UR QL STRIP.AUTO: NORMAL
KETONES UR-MCNC: NORMAL
LEUKOCYTE ESTERASE UR QL STRIP: NORMAL
NITRITE UR QL STRIP: NORMAL
PH UR STRIP: 7
PROT UR STRIP-MCNC: NORMAL
SP GR UR STRIP: 1.01

## 2019-12-27 PROCEDURE — 0502F SUBSEQUENT PRENATAL CARE: CPT

## 2020-01-07 ENCOUNTER — NON-APPOINTMENT (OUTPATIENT)
Age: 35
End: 2020-01-07

## 2020-01-07 ENCOUNTER — APPOINTMENT (OUTPATIENT)
Dept: OBGYN | Facility: CLINIC | Age: 35
End: 2020-01-07
Payer: COMMERCIAL

## 2020-01-07 VITALS
WEIGHT: 137 LBS | SYSTOLIC BLOOD PRESSURE: 110 MMHG | DIASTOLIC BLOOD PRESSURE: 60 MMHG | BODY MASS INDEX: 24.27 KG/M2 | HEIGHT: 63 IN

## 2020-01-07 PROCEDURE — 0502F SUBSEQUENT PRENATAL CARE: CPT

## 2020-01-21 ENCOUNTER — NON-APPOINTMENT (OUTPATIENT)
Age: 35
End: 2020-01-21

## 2020-01-21 ENCOUNTER — APPOINTMENT (OUTPATIENT)
Dept: OBGYN | Facility: CLINIC | Age: 35
End: 2020-01-21
Payer: COMMERCIAL

## 2020-01-21 VITALS
HEIGHT: 63 IN | WEIGHT: 138 LBS | SYSTOLIC BLOOD PRESSURE: 110 MMHG | BODY MASS INDEX: 24.45 KG/M2 | DIASTOLIC BLOOD PRESSURE: 68 MMHG

## 2020-01-21 DIAGNOSIS — Z3A.27 27 WEEKS GESTATION OF PREGNANCY: ICD-10-CM

## 2020-01-21 DIAGNOSIS — Z3A.24 24 WEEKS GESTATION OF PREGNANCY: ICD-10-CM

## 2020-01-21 DIAGNOSIS — Z3A.34 34 WEEKS GESTATION OF PREGNANCY: ICD-10-CM

## 2020-01-21 DIAGNOSIS — Z3A.19 19 WEEKS GESTATION OF PREGNANCY: ICD-10-CM

## 2020-01-21 LAB
BILIRUB UR QL STRIP: NORMAL
GLUCOSE UR-MCNC: NORMAL
HCG UR QL: 1 EU/DL
HGB UR QL STRIP.AUTO: NORMAL
KETONES UR-MCNC: NORMAL
LEUKOCYTE ESTERASE UR QL STRIP: NORMAL
NITRITE UR QL STRIP: NORMAL
PH UR STRIP: 7
PROT UR STRIP-MCNC: NORMAL
SP GR UR STRIP: 1.01

## 2020-01-21 PROCEDURE — 0502F SUBSEQUENT PRENATAL CARE: CPT

## 2020-01-21 RX ORDER — FLUTICASONE PROPIONATE AND SALMETEROL 250; 50 UG/1; UG/1
250-50 POWDER RESPIRATORY (INHALATION) TWICE DAILY
Qty: 1 | Refills: 0 | Status: DISCONTINUED | COMMUNITY
Start: 2019-12-27 | End: 2020-01-21

## 2020-01-22 ENCOUNTER — RX RENEWAL (OUTPATIENT)
Age: 35
End: 2020-01-22

## 2020-01-28 ENCOUNTER — APPOINTMENT (OUTPATIENT)
Dept: OBGYN | Facility: CLINIC | Age: 35
End: 2020-01-28

## 2020-01-29 ENCOUNTER — INPATIENT (INPATIENT)
Facility: HOSPITAL | Age: 35
LOS: 1 days | Discharge: HOME | End: 2020-01-31
Attending: OBSTETRICS & GYNECOLOGY | Admitting: OBSTETRICS & GYNECOLOGY
Payer: COMMERCIAL

## 2020-01-29 VITALS
HEART RATE: 94 BPM | SYSTOLIC BLOOD PRESSURE: 112 MMHG | TEMPERATURE: 98 F | DIASTOLIC BLOOD PRESSURE: 76 MMHG | RESPIRATION RATE: 16 BRPM

## 2020-01-29 DIAGNOSIS — Z98.890 OTHER SPECIFIED POSTPROCEDURAL STATES: Chronic | ICD-10-CM

## 2020-01-29 DIAGNOSIS — K08.409 PARTIAL LOSS OF TEETH, UNSPECIFIED CAUSE, UNSPECIFIED CLASS: Chronic | ICD-10-CM

## 2020-01-29 DIAGNOSIS — K40.21 BILATERAL INGUINAL HERNIA, WITHOUT OBSTRUCTION OR GANGRENE, RECURRENT: Chronic | ICD-10-CM

## 2020-01-29 DIAGNOSIS — O42.919 PRETERM PREMATURE RUPTURE OF MEMBRANES, UNSPECIFIED AS TO LENGTH OF TIME BETWEEN RUPTURE AND ONSET OF LABOR, UNSPECIFIED TRIMESTER: ICD-10-CM

## 2020-01-29 DIAGNOSIS — Z3A.00 WEEKS OF GESTATION OF PREGNANCY NOT SPECIFIED: ICD-10-CM

## 2020-01-29 DIAGNOSIS — O26.899 OTHER SPECIFIED PREGNANCY RELATED CONDITIONS, UNSPECIFIED TRIMESTER: ICD-10-CM

## 2020-01-29 DIAGNOSIS — O42.10 PREMATURE RUPTURE OF MEMBRANES, ONSET OF LABOR MORE THAN 24 HOURS FOLLOWING RUPTURE, UNSPECIFIED WEEKS OF GESTATION: ICD-10-CM

## 2020-01-29 DIAGNOSIS — Z90.89 ACQUIRED ABSENCE OF OTHER ORGANS: Chronic | ICD-10-CM

## 2020-01-29 LAB
BASOPHILS # BLD AUTO: 0.03 K/UL — SIGNIFICANT CHANGE UP (ref 0–0.2)
BASOPHILS NFR BLD AUTO: 0.2 % — SIGNIFICANT CHANGE UP (ref 0–2)
BLD GP AB SCN SERPL QL: NEGATIVE — SIGNIFICANT CHANGE UP
EOSINOPHIL # BLD AUTO: 0.02 K/UL — SIGNIFICANT CHANGE UP (ref 0–0.5)
EOSINOPHIL NFR BLD AUTO: 0.1 % — SIGNIFICANT CHANGE UP (ref 0–6)
HCT VFR BLD CALC: 33.7 % — LOW (ref 34.5–45)
HGB BLD-MCNC: 11.8 G/DL — SIGNIFICANT CHANGE UP (ref 11.5–15.5)
IMM GRANULOCYTES NFR BLD AUTO: 0.5 % — SIGNIFICANT CHANGE UP (ref 0–1.5)
LYMPHOCYTES # BLD AUTO: 1.51 K/UL — SIGNIFICANT CHANGE UP (ref 1–3.3)
LYMPHOCYTES # BLD AUTO: 10.8 % — LOW (ref 13–44)
MCHC RBC-ENTMCNC: 33.1 PG — SIGNIFICANT CHANGE UP (ref 27–34)
MCHC RBC-ENTMCNC: 35 % — SIGNIFICANT CHANGE UP (ref 32–36)
MCV RBC AUTO: 94.4 FL — SIGNIFICANT CHANGE UP (ref 80–100)
MONOCYTES # BLD AUTO: 0.77 K/UL — SIGNIFICANT CHANGE UP (ref 0–0.9)
MONOCYTES NFR BLD AUTO: 5.5 % — SIGNIFICANT CHANGE UP (ref 2–14)
NEUTROPHILS # BLD AUTO: 11.59 K/UL — HIGH (ref 1.8–7.4)
NEUTROPHILS NFR BLD AUTO: 82.9 % — HIGH (ref 43–77)
NRBC # FLD: 0 K/UL — SIGNIFICANT CHANGE UP (ref 0–0)
PLATELET # BLD AUTO: 175 K/UL — SIGNIFICANT CHANGE UP (ref 150–400)
PMV BLD: 10.6 FL — SIGNIFICANT CHANGE UP (ref 7–13)
RBC # BLD: 3.57 M/UL — LOW (ref 3.8–5.2)
RBC # FLD: 13.2 % — SIGNIFICANT CHANGE UP (ref 10.3–14.5)
RH IG SCN BLD-IMP: POSITIVE — SIGNIFICANT CHANGE UP
RH IG SCN BLD-IMP: POSITIVE — SIGNIFICANT CHANGE UP
WBC # BLD: 13.99 K/UL — HIGH (ref 3.8–10.5)
WBC # FLD AUTO: 13.99 K/UL — HIGH (ref 3.8–10.5)

## 2020-01-29 PROCEDURE — 59400 OBSTETRICAL CARE: CPT

## 2020-01-29 RX ORDER — DIBUCAINE 1 %
1 OINTMENT (GRAM) RECTAL EVERY 6 HOURS
Refills: 0 | Status: DISCONTINUED | OUTPATIENT
Start: 2020-01-29 | End: 2020-01-31

## 2020-01-29 RX ORDER — IBUPROFEN 200 MG
600 TABLET ORAL EVERY 6 HOURS
Refills: 0 | Status: COMPLETED | OUTPATIENT
Start: 2020-01-29 | End: 2020-12-27

## 2020-01-29 RX ORDER — LANOLIN
1 OINTMENT (GRAM) TOPICAL EVERY 6 HOURS
Refills: 0 | Status: DISCONTINUED | OUTPATIENT
Start: 2020-01-29 | End: 2020-01-31

## 2020-01-29 RX ORDER — OXYTOCIN 10 UNIT/ML
333.33 VIAL (ML) INJECTION
Qty: 20 | Refills: 0 | Status: DISCONTINUED | OUTPATIENT
Start: 2020-01-29 | End: 2020-01-31

## 2020-01-29 RX ORDER — SIMETHICONE 80 MG/1
80 TABLET, CHEWABLE ORAL EVERY 4 HOURS
Refills: 0 | Status: DISCONTINUED | OUTPATIENT
Start: 2020-01-29 | End: 2020-01-31

## 2020-01-29 RX ORDER — DIPHENHYDRAMINE HCL 50 MG
25 CAPSULE ORAL EVERY 6 HOURS
Refills: 0 | Status: DISCONTINUED | OUTPATIENT
Start: 2020-01-29 | End: 2020-01-31

## 2020-01-29 RX ORDER — PRAMOXINE HYDROCHLORIDE 150 MG/15G
1 AEROSOL, FOAM RECTAL EVERY 4 HOURS
Refills: 0 | Status: DISCONTINUED | OUTPATIENT
Start: 2020-01-29 | End: 2020-01-31

## 2020-01-29 RX ORDER — AMPICILLIN TRIHYDRATE 250 MG
2 CAPSULE ORAL ONCE
Refills: 0 | Status: COMPLETED | OUTPATIENT
Start: 2020-01-29 | End: 2020-01-29

## 2020-01-29 RX ORDER — SODIUM CHLORIDE 9 MG/ML
3 INJECTION INTRAMUSCULAR; INTRAVENOUS; SUBCUTANEOUS EVERY 8 HOURS
Refills: 0 | Status: DISCONTINUED | OUTPATIENT
Start: 2020-01-29 | End: 2020-01-31

## 2020-01-29 RX ORDER — OXYTOCIN 10 UNIT/ML
2 VIAL (ML) INJECTION
Qty: 30 | Refills: 0 | Status: DISCONTINUED | OUTPATIENT
Start: 2020-01-29 | End: 2020-01-29

## 2020-01-29 RX ORDER — AMPICILLIN TRIHYDRATE 250 MG
1 CAPSULE ORAL EVERY 4 HOURS
Refills: 0 | Status: DISCONTINUED | OUTPATIENT
Start: 2020-01-29 | End: 2020-01-29

## 2020-01-29 RX ORDER — MAGNESIUM HYDROXIDE 400 MG/1
30 TABLET, CHEWABLE ORAL
Refills: 0 | Status: DISCONTINUED | OUTPATIENT
Start: 2020-01-29 | End: 2020-01-31

## 2020-01-29 RX ORDER — HYDROCORTISONE 1 %
1 OINTMENT (GRAM) TOPICAL EVERY 6 HOURS
Refills: 0 | Status: DISCONTINUED | OUTPATIENT
Start: 2020-01-29 | End: 2020-01-31

## 2020-01-29 RX ORDER — AER TRAVELER 0.5 G/1
1 SOLUTION RECTAL; TOPICAL EVERY 4 HOURS
Refills: 0 | Status: DISCONTINUED | OUTPATIENT
Start: 2020-01-29 | End: 2020-01-31

## 2020-01-29 RX ORDER — GLYCERIN ADULT
1 SUPPOSITORY, RECTAL RECTAL AT BEDTIME
Refills: 0 | Status: DISCONTINUED | OUTPATIENT
Start: 2020-01-29 | End: 2020-01-31

## 2020-01-29 RX ORDER — OXYCODONE HYDROCHLORIDE 5 MG/1
5 TABLET ORAL ONCE
Refills: 0 | Status: DISCONTINUED | OUTPATIENT
Start: 2020-01-29 | End: 2020-01-31

## 2020-01-29 RX ORDER — OXYCODONE HYDROCHLORIDE 5 MG/1
5 TABLET ORAL
Refills: 0 | Status: DISCONTINUED | OUTPATIENT
Start: 2020-01-29 | End: 2020-01-31

## 2020-01-29 RX ORDER — SODIUM CHLORIDE 9 MG/ML
1000 INJECTION, SOLUTION INTRAVENOUS
Refills: 0 | Status: DISCONTINUED | OUTPATIENT
Start: 2020-01-29 | End: 2020-01-29

## 2020-01-29 RX ORDER — KETOROLAC TROMETHAMINE 30 MG/ML
30 SYRINGE (ML) INJECTION ONCE
Refills: 0 | Status: DISCONTINUED | OUTPATIENT
Start: 2020-01-29 | End: 2020-01-29

## 2020-01-29 RX ORDER — ACETAMINOPHEN 500 MG
975 TABLET ORAL ONCE
Refills: 0 | Status: COMPLETED | OUTPATIENT
Start: 2020-01-29 | End: 2020-01-29

## 2020-01-29 RX ORDER — ALBUTEROL 90 UG/1
2 AEROSOL, METERED ORAL EVERY 6 HOURS
Refills: 0 | Status: DISCONTINUED | OUTPATIENT
Start: 2020-01-29 | End: 2020-01-31

## 2020-01-29 RX ORDER — GENTAMICIN SULFATE 40 MG/ML
315 VIAL (ML) INJECTION ONCE
Refills: 0 | Status: DISCONTINUED | OUTPATIENT
Start: 2020-01-29 | End: 2020-01-29

## 2020-01-29 RX ORDER — ACETAMINOPHEN 500 MG
975 TABLET ORAL
Refills: 0 | Status: DISCONTINUED | OUTPATIENT
Start: 2020-01-29 | End: 2020-01-31

## 2020-01-29 RX ORDER — SODIUM CHLORIDE 9 MG/ML
1000 INJECTION, SOLUTION INTRAVENOUS ONCE
Refills: 0 | Status: COMPLETED | OUTPATIENT
Start: 2020-01-29 | End: 2020-01-29

## 2020-01-29 RX ORDER — TETANUS TOXOID, REDUCED DIPHTHERIA TOXOID AND ACELLULAR PERTUSSIS VACCINE, ADSORBED 5; 2.5; 8; 8; 2.5 [IU]/.5ML; [IU]/.5ML; UG/.5ML; UG/.5ML; UG/.5ML
0.5 SUSPENSION INTRAMUSCULAR ONCE
Refills: 0 | Status: DISCONTINUED | OUTPATIENT
Start: 2020-01-29 | End: 2020-01-31

## 2020-01-29 RX ORDER — BENZOCAINE 10 %
1 GEL (GRAM) MUCOUS MEMBRANE EVERY 6 HOURS
Refills: 0 | Status: DISCONTINUED | OUTPATIENT
Start: 2020-01-29 | End: 2020-01-31

## 2020-01-29 RX ADMIN — SODIUM CHLORIDE 3 MILLILITER(S): 9 INJECTION INTRAMUSCULAR; INTRAVENOUS; SUBCUTANEOUS at 23:50

## 2020-01-29 RX ADMIN — Medication 216 GRAM(S): at 20:35

## 2020-01-29 RX ADMIN — SODIUM CHLORIDE 125 MILLILITER(S): 9 INJECTION, SOLUTION INTRAVENOUS at 12:12

## 2020-01-29 RX ADMIN — Medication 1000 MILLIUNIT(S)/MIN: at 22:31

## 2020-01-29 RX ADMIN — SODIUM CHLORIDE 2000 MILLILITER(S): 9 INJECTION, SOLUTION INTRAVENOUS at 20:35

## 2020-01-29 RX ADMIN — Medication 108 GRAM(S): at 15:45

## 2020-01-29 RX ADMIN — Medication 975 MILLIGRAM(S): at 23:13

## 2020-01-29 RX ADMIN — Medication 216 GRAM(S): at 11:47

## 2020-01-29 RX ADMIN — Medication 30 MILLIGRAM(S): at 23:12

## 2020-01-29 RX ADMIN — Medication 975 MILLIGRAM(S): at 20:35

## 2020-01-29 RX ADMIN — Medication 2 MILLIUNIT(S)/MIN: at 17:17

## 2020-01-29 RX ADMIN — Medication 30 MILLIGRAM(S): at 22:29

## 2020-01-29 NOTE — OB PROVIDER TRIAGE NOTE - NSHPPHYSICALEXAM_GEN_ALL_CORE
abdomen soft, nontender  taus: cephalic presentation  sse: +pooling/nitrazine/ferning  sve: 3/50/-3/gross ROM, blood tinged  nst in progress

## 2020-01-29 NOTE — OB PROVIDER H&P - HISTORY OF PRESENT ILLNESS
34 y.o.  patient  FELICIANO 20 @ 36.4 weeks presents with c/o LOF since 2020. Pt denies ctx, fever, chills, nausea, vomiting. States +FM. Antepartum course complicated by low lying placenta - ATU sono 19 - low lying placenta resolved.

## 2020-01-29 NOTE — OB RN PATIENT PROFILE - NS_SOURCEOFINFO_OBGYN_ALL_OB
Last office visit on 8/27/19 and s/p DCCV on 9/3/19. Currently on Amiodarone loading post procedure of 200 mg BID x1 month then 200 mg daily thereafter. Should be scheduled to reduce dosing to once daily as of 10/3/19. Initial refill sent to [facility]. Called Jaya to verify dosing as would like to send updated script to mail order pharmacy. No answer. Left message for call back. Call back number provided along with office hours.    When Jaya returns call. Verify Amiodarone dosing and when he plans to reduce to daily dosing, should be around 10/3/19. Will then send refill to [facility] per request.        Patient

## 2020-01-29 NOTE — OB RN DELIVERY SUMMARY - NS_SEPSISRSKCALC_OBGYN_ALL_OB_FT
EOS calculated successfully. EOS Risk Factor: 0.5/1000 live births ([facility] national incidence); GA=36w4d; Temp=101.12; ROM=69.15; GBS='Unknown'; Antibiotics='GBS specific antibiotics > 2 hrs prior to birth'

## 2020-01-29 NOTE — OB PROVIDER IHI INDUCTION/AUGMENTATION NOTE - NS_CHECKALL_OBGYN_ALL_OB
Contractions pattern was reviewed/Order was written/Induction / Augmentation was discussed/FHR was reviewed/H&P was completed
Contractions pattern was reviewed/Order was written/H&P was completed/FHR was reviewed/Induction / Augmentation was discussed

## 2020-01-29 NOTE — CHART NOTE - NSCHARTNOTEFT_GEN_A_CORE
SVE:  4/80/-3  vaginal cytotec 25 mcg placed PV @ 1217  cat 1 FHT  toco: irreg   continue current management  will continue to monitor

## 2020-01-29 NOTE — OB NEONATOLOGY/PEDIATRICIAN DELIVERY SUMMARY - NSPEDSNEONOTESA_OBGYN_ALL_OB_FT
36.4 week female born via  with a cat II tracing to a 33 y/o  A+, GBS unknown (ampi X 4), PNL unremarkable with SROM on  @ 0000 and clear fluid. Maternal history significant for fever of 38.4. Infant emerged with strong cry and routine care given. Apgars 9/9. Transferred to NICU for EOS of 1.87.

## 2020-01-29 NOTE — CHART NOTE - NSCHARTNOTEFT_GEN_A_CORE
R2 Note 01-29-20 @ 18:23    Pt evaluated for prolonged decel lasting approximately 3minutes w/ return tto baseline and then another 2 minutes. Patient was turned on left lateral side. Pitocin was turned off. Patient was given O2. FHR returned to baseline    VITALS:  T(C): 36.9 (01-29-20 @ 17:56), Max: 37.0 (01-29-20 @ 13:54)  HR: 115 (01-29-20 @ 18:19) (81 - 119)  BP: 107/55 (01-29-20 @ 18:08) (95/56 - 132/70)  RR: 16 (01-29-20 @ 17:56) (16 - 18)  SpO2: 90% (01-29-20 @ 18:19) (90% - 100%)    EFM: , mod loly, +[id number], variable decels  [place]: Ctx 2Qmin  VE:10/100/0      IMPRESSION:   FHR Category: 2  Additional:    PLAN:  -Continue resuscitation  -EFM + [place]   -Expectant management  -Hold pitocin    Renato Baires PGY-2

## 2020-01-29 NOTE — OB PROVIDER TRIAGE NOTE - NSOBPROVIDERNOTE_OBGYN_ALL_OB_FT
34 y.o.  patient  FELICIANO 20 @ 36.4 weeks presents with c/o LOF since 2020. Pt denies ctx, fever, chills, nausea, vomiting. States +FM. Antepartum course complicated by low lying placenta - ATU sono 19 - low lying placenta resolved.    allergies:  NKDA  medications:  prenatal vitamins  albuterol prn  colace  singulair HS    med/surg hx:  tonsilectomy age 9  migraines  asthma - last exacerbation 1 month ago, albuterol prn, advair, no hospitalizations/intubations    OBGYN hx:  complete sab x 1  LEEP procedure    psychosocial hx:  anxiety/depression - no meds    abdomen soft, nontender  taus: cephalic presentation  sse: +pooling/nitrazine/ferning  -- GBS specimen collected and discarded per Dr Winslow  sve: 3/50/-3/gross LOF, blood tinged  nst in progress    a/p: 34 y.o.  patient  FELICIANO 20 @ 36.4 weeks PROM for IOL    GBS unknown - ampicillin prophylaxis    Discussed with Dr Winslow    IOL with 1 dose vaginal cytotec then consider pitocin augmentation    Lucy, NP

## 2020-01-30 ENCOUNTER — TRANSCRIPTION ENCOUNTER (OUTPATIENT)
Age: 35
End: 2020-01-30

## 2020-01-30 LAB — T PALLIDUM AB TITR SER: NEGATIVE — SIGNIFICANT CHANGE UP

## 2020-01-30 RX ORDER — DOCUSATE SODIUM 100 MG
1 CAPSULE ORAL
Qty: 0 | Refills: 0 | DISCHARGE

## 2020-01-30 RX ORDER — MONTELUKAST 4 MG/1
1 TABLET, CHEWABLE ORAL
Qty: 0 | Refills: 0 | DISCHARGE

## 2020-01-30 RX ORDER — FERROUS SULFATE 325(65) MG
0 TABLET ORAL
Qty: 0 | Refills: 0 | DISCHARGE

## 2020-01-30 RX ORDER — ALBUTEROL 90 UG/1
2 AEROSOL, METERED ORAL
Qty: 0 | Refills: 0 | DISCHARGE

## 2020-01-30 RX ORDER — IBUPROFEN 200 MG
1 TABLET ORAL
Qty: 0 | Refills: 0 | DISCHARGE
Start: 2020-01-30

## 2020-01-30 RX ORDER — ACETAMINOPHEN 500 MG
3 TABLET ORAL
Qty: 0 | Refills: 0 | DISCHARGE
Start: 2020-01-30

## 2020-01-30 RX ORDER — IBUPROFEN 200 MG
600 TABLET ORAL EVERY 6 HOURS
Refills: 0 | Status: DISCONTINUED | OUTPATIENT
Start: 2020-01-30 | End: 2020-01-31

## 2020-01-30 RX ORDER — ALBUTEROL 90 UG/1
2 AEROSOL, METERED ORAL
Qty: 0 | Refills: 0 | DISCHARGE
Start: 2020-01-30

## 2020-01-30 RX ADMIN — Medication 975 MILLIGRAM(S): at 21:18

## 2020-01-30 RX ADMIN — Medication 975 MILLIGRAM(S): at 22:00

## 2020-01-30 RX ADMIN — Medication 975 MILLIGRAM(S): at 00:44

## 2020-01-30 RX ADMIN — Medication 975 MILLIGRAM(S): at 11:15

## 2020-01-30 RX ADMIN — Medication 1 TABLET(S): at 12:43

## 2020-01-30 RX ADMIN — Medication 600 MILLIGRAM(S): at 23:37

## 2020-01-30 RX ADMIN — Medication 975 MILLIGRAM(S): at 10:39

## 2020-01-30 RX ADMIN — Medication 1 APPLICATION(S): at 03:15

## 2020-01-30 RX ADMIN — Medication 600 MILLIGRAM(S): at 06:05

## 2020-01-30 RX ADMIN — SODIUM CHLORIDE 3 MILLILITER(S): 9 INJECTION INTRAMUSCULAR; INTRAVENOUS; SUBCUTANEOUS at 14:00

## 2020-01-30 RX ADMIN — SODIUM CHLORIDE 3 MILLILITER(S): 9 INJECTION INTRAMUSCULAR; INTRAVENOUS; SUBCUTANEOUS at 06:00

## 2020-01-30 RX ADMIN — Medication 600 MILLIGRAM(S): at 13:30

## 2020-01-30 RX ADMIN — AER TRAVELER 1 APPLICATION(S): 0.5 SOLUTION RECTAL; TOPICAL at 03:16

## 2020-01-30 RX ADMIN — Medication 1 APPLICATION(S): at 03:16

## 2020-01-30 RX ADMIN — Medication 975 MILLIGRAM(S): at 16:45

## 2020-01-30 RX ADMIN — PRAMOXINE HYDROCHLORIDE 1 APPLICATION(S): 150 AEROSOL, FOAM RECTAL at 03:15

## 2020-01-30 RX ADMIN — Medication 600 MILLIGRAM(S): at 05:32

## 2020-01-30 RX ADMIN — SODIUM CHLORIDE 3 MILLILITER(S): 9 INJECTION INTRAMUSCULAR; INTRAVENOUS; SUBCUTANEOUS at 21:17

## 2020-01-30 RX ADMIN — Medication 975 MILLIGRAM(S): at 00:47

## 2020-01-30 RX ADMIN — Medication 975 MILLIGRAM(S): at 17:25

## 2020-01-30 RX ADMIN — Medication 600 MILLIGRAM(S): at 12:43

## 2020-01-30 NOTE — LACTATION INITIAL EVALUATION - INTERVENTION OUTCOME
Assisted pt with positioning to facilitate deep latch. Demonstrated alternate feeding with syringe as pt pumped 2.5 cc colostrum. NBN able to sustain an effective latch. Reviewed feeding on demand, recognizing feeding cues and utilizing feeding log. Hand expression demonstrated and encouraged-colostrum noted. Safe skin to skin, safe sleep and rooming in promoted. Warm line, support group encouraged./verbalizes understanding/demonstrates understanding of teaching/good return demonstration

## 2020-01-30 NOTE — PROGRESS NOTE ADULT - ASSESSMENT
35yo PPD#1 s/p . Intrapartum course c/b febrile to 38.4 s/p amp/gent/tylenol. Patient is stable and doing well post-partum.

## 2020-01-30 NOTE — DISCHARGE NOTE OB - MATERIALS PROVIDED
Tdap Vaccination (VIS Pub Date: 2012)/[facility] Hearing Screen Program/Vaccinations/[facility]  Screening Program/Shaken Baby Prevention Handout/Breastfeeding Guide and Packet/MMR Vaccination (VIS Pub Date: 2012)/  Immunization Record/Breastfeeding Log/Birth Certificate Instructions/Guide to Postpartum Care

## 2020-01-30 NOTE — DISCHARGE NOTE OB - CARE PROVIDER_API CALL
Burt Winslow)  Obstetrics and Gynecology  [address] [facility], [address] [place] [address]  Phone: [phone]  Fax: [phone]  Follow Up Time:

## 2020-01-30 NOTE — DISCHARGE NOTE OB - PATIENT PORTAL LINK FT
You can access the FollowMyHealth Patient Portal offered by [facility] by registering at the following website: [email or web address]. By joining [employer]’s FollowMyHealth portal, you will also be able to view your health information using other applications (apps) compatible with our system.

## 2020-01-30 NOTE — PROGRESS NOTE ADULT - SUBJECTIVE AND OBJECTIVE BOX
Anesthesia Post-op Note    POD#1 S/P vaginal delivery    Patient is doing well.  OOBAA. Tolerating clears.  Pain is tolerable.  No residual anesthetic issues or complications noted.    Tiffanie Leonard CRNA

## 2020-01-30 NOTE — PROGRESS NOTE ADULT - PROBLEM SELECTOR PLAN 1
- Monitor VS  - Pain well controlled, continue current pain regimen  - Increase ambulation as tolerated  - Continue regular diet    Renu Farr PGY-1  #[id number]

## 2020-01-30 NOTE — DISCHARGE NOTE OB - MEDICATION SUMMARY - MEDICATIONS TO TAKE
I will START or STAY ON the medications listed below when I get home from the hospital:    acetaminophen 325 mg oral tablet  -- 3 tab(s) by mouth   -- Indication: For Vaginal delivery    ibuprofen 600 mg oral tablet  -- 1 tab(s) by mouth every 6 hours  -- Indication: For Vaginal delivery    ZyrTEC 10 mg oral tablet  -- 1 tab(s) by mouth once a day  -- Indication: For Vaginal delivery    albuterol 90 mcg/inh inhalation aerosol  -- 2 puff(s) inhaled every 6 hours, As needed, Shortness of Breath and/or Wheezing  -- Indication: For Vaginal delivery

## 2020-01-30 NOTE — PROGRESS NOTE ADULT - SUBJECTIVE AND OBJECTIVE BOX
OB Progress Note:  PPD#1    S: 33yo PPD#1 s/p . Patient feels well. Pain is well controlled. She is tolerating a regular diet, voiding spontaneously, and ambulating without difficulty. Denies CP/SOB. Denies lightheadedness/dizziness. Denies N/V. Denies fevers/chills.    O:  Vital Signs Last 24 Hrs  T(C): 36.6 (2020 03:04), Max: 38.4 (2020 20:30)  HR: 78 (2020 03:04) (72 - 156)  BP: 104/61 (2020 03:04) (95/56 - 132/70)  RR: 18 (2020 03:04) (16 - 20)  SpO2: 98% (2020 03:04) (71% - 100%)    Physical Exam:  General: NAD  Abdomen: soft, non-tender, non-distended, fundus firm  Vaginal: Lochia wnl  Extremities: No erythema/edema    MEDICATIONS  (STANDING):  acetaminophen   Tablet .. 975 milliGRAM(s) Oral <User Schedule>  diphtheria/tetanus/pertussis (acellular) Vaccine (ADAcel) 0.5 milliLiter(s) IntraMuscular once  ibuprofen  Tablet. 600 milliGRAM(s) Oral every 6 hours  oxytocin Infusion 333.333 milliUNIT(s)/Min (1000 mL/Hr) IV Continuous <Continuous>  oxytocin Infusion 333.333 milliUNIT(s)/Min (1000 mL/Hr) IV Continuous <Continuous>  prenatal multivitamin 1 Tablet(s) Oral daily  sodium chloride 0.9% lock flush 3 milliLiter(s) IV Push every 8 hours      Labs:  Blood type: A Positive  Rubella IgG: RPR: Negative                          11.8   13.99<H> >-----------< 175    (  @ 10:57 )             33.7<L>        A/P: 33yo PPD#1 s/p . Intrapartum course c/b febrile to 38.4 s/p amp/gent/tylenol. Patient is stable and doing well post-partum.   - Monitor VS  - Pain well controlled, continue current pain regimen  - Increase ambulation as tolerated  - Continue regular diet    Renu Farr PGY-1  #[id number] OB Progress Note:  PPD#1    S: 33yo PPD#1 s/p . Patient feels well. Pain is well controlled. She is tolerating a regular diet, voiding spontaneously, and ambulating without difficulty. Denies CP/SOB. Denies lightheadedness/dizziness. Denies N/V. Denies fevers/chills.    O:  Vital Signs Last 24 Hrs  T(C): 36.6 (2020 03:04), Max: 38.4 (2020 20:30)  HR: 78 (2020 03:04) (72 - 156)  BP: 104/61 (2020 03:04) (95/56 - 132/70)  RR: 18 (2020 03:04) (16 - 20)  SpO2: 98% (2020 03:04) (71% - 100%)    Physical Exam:  General: NAD  Abdomen: soft, non-tender, non-distended, fundus firm  Vaginal: Lochia wnl  Extremities: No erythema/edema    MEDICATIONS  (STANDING):  acetaminophen   Tablet .. 975 milliGRAM(s) Oral <User Schedule>  diphtheria/tetanus/pertussis (acellular) Vaccine (ADAcel) 0.5 milliLiter(s) IntraMuscular once  ibuprofen  Tablet. 600 milliGRAM(s) Oral every 6 hours  oxytocin Infusion 333.333 milliUNIT(s)/Min (1000 mL/Hr) IV Continuous <Continuous>  oxytocin Infusion 333.333 milliUNIT(s)/Min (1000 mL/Hr) IV Continuous <Continuous>  prenatal multivitamin 1 Tablet(s) Oral daily  sodium chloride 0.9% lock flush 3 milliLiter(s) IV Push every 8 hours      Labs:  Blood type: A Positive  Rubella IgG: RPR: Negative                          11.8   13.99<H> >-----------< 175    (  @ 10:57 )             33.7<L>

## 2020-01-31 VITALS
HEART RATE: 72 BPM | SYSTOLIC BLOOD PRESSURE: 110 MMHG | RESPIRATION RATE: 18 BRPM | TEMPERATURE: 98 F | DIASTOLIC BLOOD PRESSURE: 71 MMHG | OXYGEN SATURATION: 97 %

## 2020-01-31 LAB — SPECIMEN SOURCE: SIGNIFICANT CHANGE UP

## 2020-01-31 RX ADMIN — Medication 600 MILLIGRAM(S): at 06:01

## 2020-01-31 RX ADMIN — Medication 600 MILLIGRAM(S): at 00:30

## 2020-01-31 RX ADMIN — SODIUM CHLORIDE 3 MILLILITER(S): 9 INJECTION INTRAMUSCULAR; INTRAVENOUS; SUBCUTANEOUS at 05:36

## 2020-02-01 LAB — GP B STREP GENITAL QL CULT: SIGNIFICANT CHANGE UP

## 2020-02-04 ENCOUNTER — APPOINTMENT (OUTPATIENT)
Dept: OBGYN | Facility: CLINIC | Age: 35
End: 2020-02-04

## 2020-03-09 ENCOUNTER — TRANSCRIPTION ENCOUNTER (OUTPATIENT)
Age: 35
End: 2020-03-09

## 2020-03-17 ENCOUNTER — APPOINTMENT (OUTPATIENT)
Dept: OBGYN | Facility: CLINIC | Age: 35
End: 2020-03-17
Payer: COMMERCIAL

## 2020-03-17 VITALS
BODY MASS INDEX: 20.38 KG/M2 | DIASTOLIC BLOOD PRESSURE: 70 MMHG | HEIGHT: 63 IN | WEIGHT: 115 LBS | SYSTOLIC BLOOD PRESSURE: 112 MMHG

## 2020-03-17 DIAGNOSIS — O99.019 ANEMIA COMPLICATING PREGNANCY, UNSPECIFIED TRIMESTER: ICD-10-CM

## 2020-03-17 DIAGNOSIS — O36.80X0 PREGNANCY WITH INCONCLUSIVE FETAL VIABILITY, NOT APPLICABLE OR UNSPECIFIED: ICD-10-CM

## 2020-03-17 DIAGNOSIS — Z87.09 PERSONAL HISTORY OF OTHER DISEASES OF THE RESPIRATORY SYSTEM: ICD-10-CM

## 2020-03-17 DIAGNOSIS — Z87.51 PERSONAL HISTORY OF PRE-TERM LABOR: ICD-10-CM

## 2020-03-17 DIAGNOSIS — Z36.89 ENCOUNTER FOR OTHER SPECIFIED ANTENATAL SCREENING: ICD-10-CM

## 2020-03-17 DIAGNOSIS — J45.909 DISEASES OF THE RESPIRATORY SYSTEM COMPLICATING PREGNANCY, UNSPECIFIED TRIMESTER: ICD-10-CM

## 2020-03-17 DIAGNOSIS — Z23 ENCOUNTER FOR IMMUNIZATION: ICD-10-CM

## 2020-03-17 DIAGNOSIS — O21.9 VOMITING OF PREGNANCY, UNSPECIFIED: ICD-10-CM

## 2020-03-17 DIAGNOSIS — O99.519 DISEASES OF THE RESPIRATORY SYSTEM COMPLICATING PREGNANCY, UNSPECIFIED TRIMESTER: ICD-10-CM

## 2020-03-17 DIAGNOSIS — Z34.81 ENCOUNTER FOR SUPERVISION OF OTHER NORMAL PREGNANCY, FIRST TRIMESTER: ICD-10-CM

## 2020-03-17 DIAGNOSIS — Z92.29 PERSONAL HISTORY OF OTHER DRUG THERAPY: ICD-10-CM

## 2020-03-17 DIAGNOSIS — Z3A.35 35 WEEKS GESTATION OF PREGNANCY: ICD-10-CM

## 2020-03-17 PROCEDURE — 0503F POSTPARTUM CARE VISIT: CPT

## 2020-03-17 RX ORDER — CHOLECALCIFEROL (VITAMIN D3) 10(400)/ML
160 (50 FE) DROPS ORAL TWICE DAILY
Qty: 60 | Refills: 6 | Status: DISCONTINUED | COMMUNITY
Start: 2019-12-02 | End: 2020-03-17

## 2020-03-17 RX ORDER — MONTELUKAST 10 MG/1
10 TABLET, FILM COATED ORAL DAILY
Qty: 30 | Refills: 1 | Status: DISCONTINUED | COMMUNITY
Start: 2020-01-07 | End: 2020-03-17

## 2020-03-17 RX ORDER — DOCUSATE SODIUM 100 MG/1
100 CAPSULE ORAL TWICE DAILY
Qty: 60 | Refills: 6 | Status: DISCONTINUED | COMMUNITY
Start: 2019-12-02 | End: 2020-03-17

## 2020-03-17 RX ORDER — METOCLOPRAMIDE 10 MG/1
10 TABLET ORAL EVERY 6 HOURS
Qty: 60 | Refills: 1 | Status: DISCONTINUED | COMMUNITY
Start: 2019-07-18 | End: 2020-03-17

## 2020-03-17 NOTE — HISTORY OF PRESENT ILLNESS
[Postpartum Follow Up] : postpartum follow up [Complications:___] : complications include: [unfilled] [] : delivered by vaginal delivery [Female] : Delivery History: baby girl [Wt. ___] : weighing [unfilled] [Breastfeeding] : currently nursing [S/Sx PP Depression] : signs/symptoms of postpartum depression [Suicidal Ideation] : no suicidal ideation [Fatigue] : fatigue [Back to Normal] : is back to normal in size [Mild] : mild vaginal bleeding [Normal] : the vagina was normal [Cervix Sample Taken] : cervical sample not taken for a Pap smear [Not Done] : Examination of breasts not done [Doing Well] : is doing well [No Sign of Infection] : is showing no signs of infection [Excellent Pain Control] : has excellent pain control [None] : None [de-identified] : 36 + weeks pprom [de-identified] : C/o tired, trouble eating and sleeping, sad, limited enjoyment with baby, denies si [de-identified] : depression [de-identified] : lexapro, currently in counselling

## 2020-03-31 ENCOUNTER — APPOINTMENT (OUTPATIENT)
Dept: OBGYN | Facility: CLINIC | Age: 35
End: 2020-03-31
Payer: COMMERCIAL

## 2020-03-31 VITALS
DIASTOLIC BLOOD PRESSURE: 64 MMHG | BODY MASS INDEX: 20.91 KG/M2 | SYSTOLIC BLOOD PRESSURE: 102 MMHG | HEIGHT: 63 IN | WEIGHT: 118 LBS

## 2020-03-31 PROCEDURE — 99213 OFFICE O/P EST LOW 20 MIN: CPT

## 2020-03-31 RX ORDER — ESCITALOPRAM OXALATE 5 MG/1
5 TABLET ORAL DAILY
Qty: 15 | Refills: 0 | Status: DISCONTINUED | COMMUNITY
Start: 2020-03-17 | End: 2020-03-31

## 2020-03-31 NOTE — HISTORY OF PRESENT ILLNESS
[Definite:  ___ (Date)] : the last menstrual period was [unfilled] [Normal Amount/Duration] : was of a normal amount and duration [Sexually Active] : is sexually active

## 2020-03-31 NOTE — COUNSELING
[Breast Self Exam] : breast self exam [Nutrition] : nutrition [Exercise] : exercise [Vitamins/Supplements] : vitamins/supplements [STD (testing, results, tx)] : STD (testing, results, tx) [Contraception] : contraception [Safe Sexual Practices] : safe sexual practices [Medication Management] : medication management

## 2020-08-14 ENCOUNTER — APPOINTMENT (OUTPATIENT)
Dept: OBGYN | Facility: CLINIC | Age: 35
End: 2020-08-14
Payer: COMMERCIAL

## 2020-08-14 VITALS
WEIGHT: 124 LBS | SYSTOLIC BLOOD PRESSURE: 110 MMHG | DIASTOLIC BLOOD PRESSURE: 68 MMHG | BODY MASS INDEX: 21.97 KG/M2 | HEIGHT: 63 IN

## 2020-08-14 PROCEDURE — 99395 PREV VISIT EST AGE 18-39: CPT

## 2020-08-14 RX ORDER — PNV NO.118/IRON FUMARATE/FA 29 MG-1 MG
TABLET,CHEWABLE ORAL DAILY
Qty: 30 | Refills: 11 | Status: DISCONTINUED | COMMUNITY
Start: 2019-02-07 | End: 2020-08-14

## 2020-08-14 RX ORDER — ESCITALOPRAM OXALATE 10 MG/1
10 TABLET ORAL DAILY
Qty: 10 | Refills: 0 | Status: DISCONTINUED | COMMUNITY
Start: 2020-03-31 | End: 2020-08-14

## 2020-08-14 NOTE — PHYSICAL EXAM
[Awake] : awake [Alert] : alert [Acute Distress] : no acute distress [Mass] : no breast mass [Nipple Discharge] : no nipple discharge [Axillary LAD] : no axillary lymphadenopathy [Soft] : soft [Tender] : non tender [Oriented x3] : oriented to person, place, and time [Normal] : uterus [Uterine Adnexae] : were not tender and not enlarged [No Bleeding] : there was no active vaginal bleeding [External Hemorrhoid] : no external hemorrhoids

## 2020-08-16 LAB — HPV HIGH+LOW RISK DNA PNL CVX: NOT DETECTED

## 2020-08-21 LAB — CYTOLOGY CVX/VAG DOC THIN PREP: NORMAL

## 2020-08-24 NOTE — OB NEONATOLOGY/PEDIATRICIAN DELIVERY SUMMARY - BABY A: APGAR 1 MIN SCORE, DELIVERY
9 O-T Plasty Text: The defect edges were debeveled with a #15 scalpel blade.  Given the location of the defect, shape of the defect and the proximity to free margins an O-T plasty was deemed most appropriate.  Using a sterile surgical marker, an appropriate O-T plasty was drawn incorporating the defect and placing the expected incisions within the relaxed skin tension lines where possible.    The area thus outlined was incised deep to adipose tissue with a #15 scalpel blade.  The skin margins were undermined to an appropriate distance in all directions utilizing iris scissors.

## 2020-10-05 NOTE — OB PROVIDER TRIAGE NOTE - NSANTENATALSTERA_OBGYN_ALL_OB
Discharged No AMA (saw a physician/midlevel provider and clinician was able to provide reasons for staying for treatment & form is signed)

## 2020-12-23 ENCOUNTER — TRANSCRIPTION ENCOUNTER (OUTPATIENT)
Age: 35
End: 2020-12-23

## 2021-02-19 ENCOUNTER — APPOINTMENT (OUTPATIENT)
Dept: OBGYN | Facility: CLINIC | Age: 36
End: 2021-02-19
Payer: COMMERCIAL

## 2021-02-19 ENCOUNTER — APPOINTMENT (OUTPATIENT)
Dept: OBGYN | Facility: CLINIC | Age: 36
End: 2021-02-19

## 2021-02-19 VITALS
HEIGHT: 63 IN | WEIGHT: 117 LBS | DIASTOLIC BLOOD PRESSURE: 50 MMHG | SYSTOLIC BLOOD PRESSURE: 92 MMHG | BODY MASS INDEX: 20.73 KG/M2

## 2021-02-19 DIAGNOSIS — F41.9 ANXIETY DISORDER, UNSPECIFIED: ICD-10-CM

## 2021-02-19 DIAGNOSIS — F32.9 ANXIETY DISORDER, UNSPECIFIED: ICD-10-CM

## 2021-02-19 LAB
HCG UR QL: NEGATIVE
QUALITY CONTROL: YES

## 2021-02-19 PROCEDURE — 81025 URINE PREGNANCY TEST: CPT

## 2021-02-19 PROCEDURE — 99213 OFFICE O/P EST LOW 20 MIN: CPT

## 2021-02-19 PROCEDURE — 99072 ADDL SUPL MATRL&STAF TM PHE: CPT

## 2021-02-19 NOTE — PLAN
[FreeTextEntry1] : Discussed causes of vaginal lumps that come and go.  Reviewed birth control options.  Discussed ssri and other treatments for anxiety.

## 2021-02-19 NOTE — HISTORY OF PRESENT ILLNESS
[FreeTextEntry1] : Patient feels discomfort at 7 oclock on vaginal wall, "pulling". Small lump was present but no longer there. Switched ssri but not sure to which one.  Stopped ocp.

## 2021-05-03 NOTE — OB RN TRIAGE NOTE - ABORTIONS, OB PROFILE
Your discharge plan includes access to our free Care Transitions program.     As your Care Transition Nurse I will contact you via phone within 2 business days after your discharge from the hospital. Please have your discharge instructions and medications ready for review. Over the next 30 days I will call you at least once a week. I am able to assist with arranging follow-up appointments. I have direct contact with your physicians and will alert them with any health or medication concerns or relay your questions.     Your Care Transitions Nurse is Dianna Henry RN  If you have any questions or concerns after your discharge and prior to our first call, you can reach me at [phone].   
1

## 2021-06-21 NOTE — OB RN TRIAGE NOTE - COMFORT/ACCEPTABLE PAIN LEVEL (0-10)
Referred by: Silvestre Palmer MD; Medical Diagnosis (from order):    Diagnosis Information      Diagnosis    823.00 (ICD-9-CM) - S82.142A (ICD-10-CM) - Closed fracture of left tibial plateau, initial encounter                Daily Treatment Note -  Physical Therapy    Visit:  6     SUBJECTIVE                                                                                                             Patient states that she is just okay today. She has a lot of things going on that have been very stressful. Thinks that she may have over done it this weekend - was doing a lot of errands. Also accidentally ran over her cane the other day so she only has 3 prongs on it.   Functional Change: As above   Pain / Symptoms:  Pain rating (out of 10): Current: 3     OBJECTIVE                                                                                                                        TREATMENT                                                                                                                  Therapeutic Exercise:  NuStep 6 min, level 3, seat 7  Swiss ball knee to chest 1 x 15, red ball   Swiss ball bridge 2 x 10, red ball   Straight leg raise 2 x 10 bilateral   4-way Cable walkouts 1 x 8 each, 13 pounds   Rocker board 1 x 15, A/P, feet tandem, bilateral   BOSU step-ups 1 x 12 bilateral   Airex ball toss 1 x 15 each   - feet neutral   - feet together  - feet tandem, bilateral       Skilled input: verbal instruction/cues and tactile instruction/cues    Writer verbally educated and received verbal consent for hand placement, positioning of patient, and techniques to be performed today from patient for clothing adjustments for techniques, hand placement and palpation for techniques and therapist position for techniques as described above and how they are pertinent to the patient's plan of care.    Home Exercise Program: Access Code: [email or web address]  URL: [email or web address]  Date:  06/10/2021  Prepared by: Martinez Aguirre    Exercises  Supine Heel Slide with Strap - 2 x daily - 6 x weekly - 2 sets - 8 reps - 5 hold  Standing Tandem Balance with Counter Support - 2 x daily - 6 x weekly - 2 sets - 4 reps - 30 hold  Standing March with Counter Support - 2 x daily - 6 x weekly - 2 sets - 6 reps  2 x 6 orange tband resisted hip abduction   Lateral eccentric step down with book or raised surface at home      ASSESSMENT                                                                                                             Patient tolerated today's session well. Presenting with some increased soreness and therefore spent some time on some nonweightbearing strengthening tasks. Demonstrates need for continued lower extremity strengthening and stabilization. Skilled therapy is needed to address these goals.     Patient Education:   Results of above outlined education: Verbalizes understanding and Needs reinforcement      PLAN                                                                                                                           Suggestions for next session as indicated: Progress per plan of care         Therapy procedure time and total treatment time can be found documented on the Time Entry flowsheet   7

## 2021-08-20 ENCOUNTER — APPOINTMENT (OUTPATIENT)
Dept: OBGYN | Facility: CLINIC | Age: 36
End: 2021-08-20
Payer: COMMERCIAL

## 2021-08-20 ENCOUNTER — NON-APPOINTMENT (OUTPATIENT)
Age: 36
End: 2021-08-20

## 2021-08-20 VITALS
DIASTOLIC BLOOD PRESSURE: 80 MMHG | HEIGHT: 63 IN | SYSTOLIC BLOOD PRESSURE: 130 MMHG | WEIGHT: 128 LBS | BODY MASS INDEX: 22.68 KG/M2

## 2021-08-20 DIAGNOSIS — Z72.821 INADEQUATE SLEEP HYGIENE: ICD-10-CM

## 2021-08-20 PROCEDURE — 99395 PREV VISIT EST AGE 18-39: CPT

## 2021-08-20 RX ORDER — ESCITALOPRAM OXALATE 10 MG/1
10 TABLET ORAL DAILY
Qty: 1 | Refills: 1 | Status: DISCONTINUED | COMMUNITY
Start: 2020-03-31 | End: 2021-08-20

## 2021-08-20 RX ORDER — NORGESTREL AND ETHINYL ESTRADIOL 0.3-0.03MG
0.3-3 KIT ORAL DAILY
Qty: 3 | Refills: 1 | Status: DISCONTINUED | COMMUNITY
Start: 2020-03-31 | End: 2021-08-20

## 2021-08-20 RX ORDER — BUTALBITAL, ACETAMINOPHEN AND CAFFEINE 300; 50; 40 MG/1; MG/1; MG/1
50-300-40 CAPSULE ORAL
Qty: 15 | Refills: 0 | Status: DISCONTINUED | COMMUNITY
Start: 2019-07-18 | End: 2021-08-20

## 2021-08-20 NOTE — PHYSICAL EXAM
[Appropriately responsive] : appropriately responsive [Alert] : alert [Soft] : soft [Non-tender] : non-tender [Oriented x3] : oriented x3 [Examination Of The Breasts] : a normal appearance [No Masses] : no breast masses were palpable [Labia Majora] : normal [Labia Minora] : normal [Cystocele] : a cystocele [Normal] : normal [Uterine Adnexae] : normal

## 2021-08-20 NOTE — HISTORY OF PRESENT ILLNESS
[Patient reported PAP Smear was normal] : Patient reported PAP Smear was normal [N] : Patient does not use contraception [Y] : Patient is sexually active [Monogamous (Male Partner)] : is monogamous with a male partner [Patient refuses STI testing] : Patient refuses STI testing [FreeTextEntry1] : 36 yo presents for annual exam and pap smear. No complaints.  Trying to conceive x 1 month. [PapSmeardate] : 8/14/21 [LMPDate] : 8/8/21

## 2021-08-20 NOTE — COUNSELING
[Nutrition/ Exercise/ Weight Management] : nutrition, exercise, weight management [Body Image] : body image [Breast Self Exam] : breast self exam [Preconception Care/ Fertility options] : preconception care, fertility options [FreeTextEntry2] : Pelvic floor exercises and Kegels

## 2021-08-23 LAB
C TRACH RRNA SPEC QL NAA+PROBE: NOT DETECTED
HPV HIGH+LOW RISK DNA PNL CVX: NOT DETECTED
N GONORRHOEA RRNA SPEC QL NAA+PROBE: NOT DETECTED
SOURCE AMPLIFICATION: NORMAL
SOURCE TP AMPLIFICATION: NORMAL
T VAGINALIS RRNA SPEC QL NAA+PROBE: NOT DETECTED

## 2021-08-27 LAB — CYTOLOGY CVX/VAG DOC THIN PREP: NORMAL

## 2021-11-08 ENCOUNTER — APPOINTMENT (OUTPATIENT)
Dept: OBGYN | Facility: CLINIC | Age: 36
End: 2021-11-08
Payer: COMMERCIAL

## 2021-11-08 VITALS
DIASTOLIC BLOOD PRESSURE: 90 MMHG | BODY MASS INDEX: 23.04 KG/M2 | WEIGHT: 130 LBS | SYSTOLIC BLOOD PRESSURE: 140 MMHG | HEIGHT: 63 IN

## 2021-11-08 DIAGNOSIS — A60.00 HERPESVIRAL INFECTION OF UROGENITAL SYSTEM, UNSPECIFIED: ICD-10-CM

## 2021-11-08 DIAGNOSIS — Z30.9 ENCOUNTER FOR CONTRACEPTIVE MANAGEMENT, UNSPECIFIED: ICD-10-CM

## 2021-11-08 DIAGNOSIS — R10.2 PELVIC AND PERINEAL PAIN: ICD-10-CM

## 2021-11-08 LAB
HCG UR QL: NEGATIVE
QUALITY CONTROL: YES

## 2021-11-08 PROCEDURE — 99213 OFFICE O/P EST LOW 20 MIN: CPT

## 2021-11-08 PROCEDURE — 81025 URINE PREGNANCY TEST: CPT

## 2021-11-08 NOTE — PHYSICAL EXAM
[Chaperone Present] : A chaperone was present in the examining room during all aspects of the physical examination [Appropriately responsive] : appropriately responsive [Alert] : alert [No Acute Distress] : no acute distress [No Lymphadenopathy] : no lymphadenopathy [Soft] : soft [Non-tender] : non-tender [Non-distended] : non-distended [No HSM] : No HSM [No Lesions] : no lesions [No Mass] : no mass [Oriented x3] : oriented x3 [Examination Of The Breasts] : a normal appearance [No Masses] : no breast masses were palpable [Labia Majora] : normal [Labia Minora] : normal [Normal] : normal [Uterine Adnexae] : normal

## 2021-11-08 NOTE — HISTORY OF PRESENT ILLNESS
[FreeTextEntry1] : Trying to conceive x 6 months without success.\par Desires pregnancy. \par Menses irregular for the past 6-12 months.

## 2021-11-08 NOTE — PLAN
[FreeTextEntry1] : Discussed ovulation inducers, injectables iui ivf ricardo.\par Blood work today.\par FU for sonogram\par Will try letrozole.

## 2021-11-08 NOTE — END OF VISIT
[Time Spent: ___ minutes] : I have spent [unfilled] minutes of time on the encounter.
Patient unable to complete

## 2021-11-08 NOTE — REVIEW OF SYSTEMS
[Abn Vaginal bleeding] : abnormal vaginal bleeding [Headache] : headache [Anxiety] : anxiety [Depression] : depression [Easy Bleeding] : easy bleeding [Negative] : Heme/Lymph

## 2021-11-09 ENCOUNTER — APPOINTMENT (OUTPATIENT)
Dept: OBGYN | Facility: CLINIC | Age: 36
End: 2021-11-09
Payer: COMMERCIAL

## 2021-11-09 VITALS
SYSTOLIC BLOOD PRESSURE: 118 MMHG | BODY MASS INDEX: 23.21 KG/M2 | DIASTOLIC BLOOD PRESSURE: 78 MMHG | HEIGHT: 63 IN | WEIGHT: 131 LBS

## 2021-11-09 DIAGNOSIS — N92.6 IRREGULAR MENSTRUATION, UNSPECIFIED: ICD-10-CM

## 2021-11-09 LAB
ALBUMIN SERPL ELPH-MCNC: 4.4 G/DL
ALP BLD-CCNC: 72 U/L
ALT SERPL-CCNC: 9 U/L
ANION GAP SERPL CALC-SCNC: 11 MMOL/L
AST SERPL-CCNC: 13 U/L
BASOPHILS # BLD AUTO: 0.03 K/UL
BASOPHILS NFR BLD AUTO: 0.4 %
BILIRUB SERPL-MCNC: 0.3 MG/DL
BUN SERPL-MCNC: 12 MG/DL
CALCIUM SERPL-MCNC: 9.2 MG/DL
CHLORIDE SERPL-SCNC: 107 MMOL/L
CO2 SERPL-SCNC: 25 MMOL/L
CREAT SERPL-MCNC: 0.64 MG/DL
DHEA-S SERPL-MCNC: 125 UG/DL
EOSINOPHIL # BLD AUTO: 0.06 K/UL
EOSINOPHIL NFR BLD AUTO: 0.7 %
ESTIMATED AVERAGE GLUCOSE: 94 MG/DL
ESTRADIOL SERPL-MCNC: 84 PG/ML
FSH SERPL-MCNC: 3.8 IU/L
GLUCOSE SERPL-MCNC: 82 MG/DL
HBA1C MFR BLD HPLC: 4.9 %
HBV SURFACE AG SER QL: NONREACTIVE
HCT VFR BLD CALC: 41.8 %
HCV AB SER QL: NONREACTIVE
HCV S/CO RATIO: 0.18 S/CO
HGB BLD-MCNC: 14 G/DL
HIV1+2 AB SPEC QL IA.RAPID: NONREACTIVE
IMM GRANULOCYTES NFR BLD AUTO: 0.4 %
INSULIN SERPL-MCNC: 11.8 UU/ML
LH SERPL-ACNC: 5.7 IU/L
LYMPHOCYTES # BLD AUTO: 1.8 K/UL
LYMPHOCYTES NFR BLD AUTO: 21.9 %
MAN DIFF?: NORMAL
MCHC RBC-ENTMCNC: 30.2 PG
MCHC RBC-ENTMCNC: 33.5 GM/DL
MCV RBC AUTO: 90.3 FL
MONOCYTES # BLD AUTO: 0.44 K/UL
MONOCYTES NFR BLD AUTO: 5.4 %
NEUTROPHILS # BLD AUTO: 5.85 K/UL
NEUTROPHILS NFR BLD AUTO: 71.2 %
PLATELET # BLD AUTO: 271 K/UL
POTASSIUM SERPL-SCNC: 4.2 MMOL/L
PROGEST SERPL-MCNC: 10.2 NG/ML
PROLACTIN SERPL-MCNC: 9.6 NG/ML
PROT SERPL-MCNC: 7.2 G/DL
RBC # BLD: 4.63 M/UL
RBC # FLD: 12.3 %
SODIUM SERPL-SCNC: 143 MMOL/L
T PALLIDUM AB SER QL IA: NEGATIVE
T4 FREE SERPL-MCNC: 1 NG/DL
TESTOST SERPL-MCNC: <2.5 NG/DL
TSH SERPL-ACNC: 0.82 UIU/ML
WBC # FLD AUTO: 8.21 K/UL

## 2021-11-09 PROCEDURE — 76830 TRANSVAGINAL US NON-OB: CPT

## 2021-12-06 ENCOUNTER — APPOINTMENT (OUTPATIENT)
Dept: OBGYN | Facility: CLINIC | Age: 36
End: 2021-12-06
Payer: COMMERCIAL

## 2021-12-06 VITALS
WEIGHT: 132 LBS | HEIGHT: 63 IN | SYSTOLIC BLOOD PRESSURE: 136 MMHG | BODY MASS INDEX: 23.39 KG/M2 | DIASTOLIC BLOOD PRESSURE: 90 MMHG

## 2021-12-06 DIAGNOSIS — Z77.22 CONTACT WITH AND (SUSPECTED) EXPOSURE TO ENVIRONMENTAL TOBACCO SMOKE (ACUTE) (CHRONIC): ICD-10-CM

## 2021-12-06 PROCEDURE — 99213 OFFICE O/P EST LOW 20 MIN: CPT

## 2021-12-06 RX ORDER — SERTRALINE HYDROCHLORIDE 100 MG/1
100 TABLET, FILM COATED ORAL DAILY
Refills: 0 | Status: COMPLETED | COMMUNITY
Start: 2021-08-20 | End: 2021-12-06

## 2021-12-06 NOTE — DISCUSSION/SUMMARY
[FreeTextEntry1] : Pt having trouble conceiving - discussed ovulationa nd timed intercourse \par Bleeding with ovulation\par would recommend sono\par recommend f/u with [facility]EL[facility] at this time

## 2021-12-06 NOTE — HISTORY OF PRESENT ILLNESS
[TextBox_4] : Pt presents to establish care. Had a baby 1/2020 and periods since then have become irregular.  Has been TTC since June and cant get pregnant.  Took letrazole and it didn’t work.  Periods are coming once a month - sometimes last 3 days.  Has regular period but has spotting 2 weeks after.   [PapSmeardate] : 2021

## 2022-10-29 ENCOUNTER — TRANSCRIPTION ENCOUNTER (OUTPATIENT)
Age: 37
End: 2022-10-29

## 2022-10-30 ENCOUNTER — RESULT REVIEW (OUTPATIENT)
Age: 37
End: 2022-10-30

## 2022-10-30 ENCOUNTER — TRANSCRIPTION ENCOUNTER (OUTPATIENT)
Age: 37
End: 2022-10-30

## 2022-10-30 ENCOUNTER — INPATIENT (INPATIENT)
Facility: HOSPITAL | Age: 37
LOS: 0 days | Discharge: HOME | End: 2022-10-30
Attending: OBSTETRICS & GYNECOLOGY | Admitting: OBSTETRICS & GYNECOLOGY

## 2022-10-30 VITALS
HEART RATE: 111 BPM | OXYGEN SATURATION: 100 % | TEMPERATURE: 98 F | DIASTOLIC BLOOD PRESSURE: 111 MMHG | SYSTOLIC BLOOD PRESSURE: 190 MMHG | HEIGHT: 63 IN | RESPIRATION RATE: 22 BRPM

## 2022-10-30 VITALS
SYSTOLIC BLOOD PRESSURE: 130 MMHG | RESPIRATION RATE: 11 BRPM | DIASTOLIC BLOOD PRESSURE: 84 MMHG | HEART RATE: 94 BPM | OXYGEN SATURATION: 96 %

## 2022-10-30 DIAGNOSIS — K40.21 BILATERAL INGUINAL HERNIA, WITHOUT OBSTRUCTION OR GANGRENE, RECURRENT: Chronic | ICD-10-CM

## 2022-10-30 DIAGNOSIS — K08.409 PARTIAL LOSS OF TEETH, UNSPECIFIED CAUSE, UNSPECIFIED CLASS: Chronic | ICD-10-CM

## 2022-10-30 DIAGNOSIS — Z98.890 OTHER SPECIFIED POSTPROCEDURAL STATES: Chronic | ICD-10-CM

## 2022-10-30 DIAGNOSIS — O00.90 UNSPECIFIED ECTOPIC PREGNANCY WITHOUT INTRAUTERINE PREGNANCY: ICD-10-CM

## 2022-10-30 DIAGNOSIS — Z90.89 ACQUIRED ABSENCE OF OTHER ORGANS: Chronic | ICD-10-CM

## 2022-10-30 LAB
ALBUMIN SERPL ELPH-MCNC: 3.9 G/DL — SIGNIFICANT CHANGE UP (ref 3.3–5)
ALP SERPL-CCNC: 76 U/L — SIGNIFICANT CHANGE UP (ref 40–120)
ALT FLD-CCNC: 12 U/L — SIGNIFICANT CHANGE UP (ref 4–33)
ANION GAP SERPL CALC-SCNC: 11 MMOL/L — SIGNIFICANT CHANGE UP (ref 7–14)
APTT BLD: 29.4 SEC — SIGNIFICANT CHANGE UP (ref 27–36.3)
AST SERPL-CCNC: 15 U/L — SIGNIFICANT CHANGE UP (ref 4–32)
BASE EXCESS BLDV CALC-SCNC: 1.1 MMOL/L — SIGNIFICANT CHANGE UP (ref -2–3)
BASOPHILS # BLD AUTO: 0.04 K/UL — SIGNIFICANT CHANGE UP (ref 0–0.2)
BASOPHILS NFR BLD AUTO: 0.4 % — SIGNIFICANT CHANGE UP (ref 0–2)
BILIRUB SERPL-MCNC: 0.2 MG/DL — SIGNIFICANT CHANGE UP (ref 0.2–1.2)
BLD GP AB SCN SERPL QL: NEGATIVE — SIGNIFICANT CHANGE UP
BLOOD GAS VENOUS COMPREHENSIVE RESULT: SIGNIFICANT CHANGE UP
BUN SERPL-MCNC: 15 MG/DL — SIGNIFICANT CHANGE UP (ref 7–23)
CALCIUM SERPL-MCNC: 9.6 MG/DL — SIGNIFICANT CHANGE UP (ref 8.4–10.5)
CHLORIDE BLDV-SCNC: 104 MMOL/L — SIGNIFICANT CHANGE UP (ref 96–108)
CHLORIDE SERPL-SCNC: 104 MMOL/L — SIGNIFICANT CHANGE UP (ref 98–107)
CO2 BLDV-SCNC: 26.2 MMOL/L — HIGH (ref 22–26)
CO2 SERPL-SCNC: 23 MMOL/L — SIGNIFICANT CHANGE UP (ref 22–31)
CREAT SERPL-MCNC: 0.69 MG/DL — SIGNIFICANT CHANGE UP (ref 0.5–1.3)
EGFR: 116 ML/MIN/1.73M2 — SIGNIFICANT CHANGE UP
EOSINOPHIL # BLD AUTO: 0.12 K/UL — SIGNIFICANT CHANGE UP (ref 0–0.5)
EOSINOPHIL NFR BLD AUTO: 1.1 % — SIGNIFICANT CHANGE UP (ref 0–6)
FLUAV AG NPH QL: SIGNIFICANT CHANGE UP
FLUBV AG NPH QL: SIGNIFICANT CHANGE UP
GAS PNL BLDV: 137 MMOL/L — SIGNIFICANT CHANGE UP (ref 136–145)
GLUCOSE BLDV-MCNC: 115 MG/DL — HIGH (ref 70–99)
GLUCOSE SERPL-MCNC: 126 MG/DL — HIGH (ref 70–99)
HCG SERPL-ACNC: 2532 MIU/ML — SIGNIFICANT CHANGE UP
HCO3 BLDV-SCNC: 25 MMOL/L — SIGNIFICANT CHANGE UP (ref 22–29)
HCT VFR BLD CALC: 38.5 % — SIGNIFICANT CHANGE UP (ref 34.5–45)
HCT VFR BLDA CALC: 40 % — SIGNIFICANT CHANGE UP (ref 34.5–46.5)
HGB BLD CALC-MCNC: 13.4 G/DL — SIGNIFICANT CHANGE UP (ref 11.5–15.5)
HGB BLD-MCNC: 13.4 G/DL — SIGNIFICANT CHANGE UP (ref 11.5–15.5)
IANC: 6.63 K/UL — SIGNIFICANT CHANGE UP (ref 1.8–7.4)
IMM GRANULOCYTES NFR BLD AUTO: 0.2 % — SIGNIFICANT CHANGE UP (ref 0–0.9)
INR BLD: 1.12 RATIO — SIGNIFICANT CHANGE UP (ref 0.88–1.16)
LACTATE BLDV-MCNC: 1.8 MMOL/L — SIGNIFICANT CHANGE UP (ref 0.5–2)
LIDOCAIN IGE QN: 33 U/L — SIGNIFICANT CHANGE UP (ref 7–60)
LYMPHOCYTES # BLD AUTO: 28.8 % — SIGNIFICANT CHANGE UP (ref 13–44)
LYMPHOCYTES # BLD AUTO: 3.03 K/UL — SIGNIFICANT CHANGE UP (ref 1–3.3)
MCHC RBC-ENTMCNC: 30.2 PG — SIGNIFICANT CHANGE UP (ref 27–34)
MCHC RBC-ENTMCNC: 34.8 GM/DL — SIGNIFICANT CHANGE UP (ref 32–36)
MCV RBC AUTO: 86.9 FL — SIGNIFICANT CHANGE UP (ref 80–100)
MONOCYTES # BLD AUTO: 0.68 K/UL — SIGNIFICANT CHANGE UP (ref 0–0.9)
MONOCYTES NFR BLD AUTO: 6.5 % — SIGNIFICANT CHANGE UP (ref 2–14)
NEUTROPHILS # BLD AUTO: 6.63 K/UL — SIGNIFICANT CHANGE UP (ref 1.8–7.4)
NEUTROPHILS NFR BLD AUTO: 63 % — SIGNIFICANT CHANGE UP (ref 43–77)
NRBC # BLD: 0 /100 WBCS — SIGNIFICANT CHANGE UP (ref 0–0)
NRBC # FLD: 0 K/UL — SIGNIFICANT CHANGE UP (ref 0–0)
PCO2 BLDV: 37 MMHG — LOW (ref 39–42)
PH BLDV: 7.44 — HIGH (ref 7.32–7.43)
PLATELET # BLD AUTO: 323 K/UL — SIGNIFICANT CHANGE UP (ref 150–400)
PO2 BLDV: 49 MMHG — SIGNIFICANT CHANGE UP
POTASSIUM BLDV-SCNC: 3.8 MMOL/L — SIGNIFICANT CHANGE UP (ref 3.5–5.1)
POTASSIUM SERPL-MCNC: 3.9 MMOL/L — SIGNIFICANT CHANGE UP (ref 3.5–5.3)
POTASSIUM SERPL-SCNC: 3.9 MMOL/L — SIGNIFICANT CHANGE UP (ref 3.5–5.3)
PROT SERPL-MCNC: 7.1 G/DL — SIGNIFICANT CHANGE UP (ref 6–8.3)
PROTHROM AB SERPL-ACNC: 13 SEC — SIGNIFICANT CHANGE UP (ref 10.5–13.4)
RBC # BLD: 4.43 M/UL — SIGNIFICANT CHANGE UP (ref 3.8–5.2)
RBC # FLD: 12 % — SIGNIFICANT CHANGE UP (ref 10.3–14.5)
RH IG SCN BLD-IMP: POSITIVE — SIGNIFICANT CHANGE UP
RSV RNA NPH QL NAA+NON-PROBE: SIGNIFICANT CHANGE UP
SAO2 % BLDV: 83.6 % — SIGNIFICANT CHANGE UP
SARS-COV-2 RNA SPEC QL NAA+PROBE: SIGNIFICANT CHANGE UP
SODIUM SERPL-SCNC: 138 MMOL/L — SIGNIFICANT CHANGE UP (ref 135–145)
WBC # BLD: 10.52 K/UL — HIGH (ref 3.8–10.5)
WBC # FLD AUTO: 10.52 K/UL — HIGH (ref 3.8–10.5)

## 2022-10-30 PROCEDURE — 99285 EMERGENCY DEPT VISIT HI MDM: CPT

## 2022-10-30 PROCEDURE — 88302 TISSUE EXAM BY PATHOLOGIST: CPT | Mod: 26

## 2022-10-30 PROCEDURE — 76817 TRANSVAGINAL US OBSTETRIC: CPT | Mod: 26

## 2022-10-30 PROCEDURE — 59151 TREAT ECTOPIC PREGNANCY: CPT | Mod: GC

## 2022-10-30 RX ORDER — ONDANSETRON 8 MG/1
1 TABLET, FILM COATED ORAL
Qty: 30 | Refills: 0
Start: 2022-10-30 | End: 2022-11-08

## 2022-10-30 RX ORDER — OXYCODONE HYDROCHLORIDE 5 MG/1
1 TABLET ORAL
Qty: 5 | Refills: 0
Start: 2022-10-30

## 2022-10-30 RX ORDER — MORPHINE SULFATE 50 MG/1
4 CAPSULE, EXTENDED RELEASE ORAL ONCE
Refills: 0 | Status: DISCONTINUED | OUTPATIENT
Start: 2022-10-30 | End: 2022-10-30

## 2022-10-30 RX ORDER — OXYCODONE HYDROCHLORIDE 5 MG/1
5 TABLET ORAL ONCE
Refills: 0 | Status: DISCONTINUED | OUTPATIENT
Start: 2022-10-30 | End: 2022-10-30

## 2022-10-30 RX ORDER — ONDANSETRON 8 MG/1
4 TABLET, FILM COATED ORAL ONCE
Refills: 0 | Status: COMPLETED | OUTPATIENT
Start: 2022-10-30 | End: 2022-10-30

## 2022-10-30 RX ORDER — FENTANYL CITRATE 50 UG/ML
50 INJECTION INTRAVENOUS ONCE
Refills: 0 | Status: DISCONTINUED | OUTPATIENT
Start: 2022-10-30 | End: 2022-10-30

## 2022-10-30 RX ORDER — SODIUM CHLORIDE 9 MG/ML
1000 INJECTION INTRAMUSCULAR; INTRAVENOUS; SUBCUTANEOUS ONCE
Refills: 0 | Status: COMPLETED | OUTPATIENT
Start: 2022-10-30 | End: 2022-10-30

## 2022-10-30 RX ORDER — FENTANYL CITRATE 50 UG/ML
50 INJECTION INTRAVENOUS
Refills: 0 | Status: DISCONTINUED | OUTPATIENT
Start: 2022-10-30 | End: 2022-10-30

## 2022-10-30 RX ORDER — KETOROLAC TROMETHAMINE 30 MG/ML
15 SYRINGE (ML) INJECTION ONCE
Refills: 0 | Status: DISCONTINUED | OUTPATIENT
Start: 2022-10-30 | End: 2022-10-30

## 2022-10-30 RX ORDER — METOCLOPRAMIDE HCL 10 MG
10 TABLET ORAL ONCE
Refills: 0 | Status: COMPLETED | OUTPATIENT
Start: 2022-10-30 | End: 2022-10-30

## 2022-10-30 RX ADMIN — FENTANYL CITRATE 50 MICROGRAM(S): 50 INJECTION INTRAVENOUS at 02:45

## 2022-10-30 RX ADMIN — OXYCODONE HYDROCHLORIDE 5 MILLIGRAM(S): 5 TABLET ORAL at 11:27

## 2022-10-30 RX ADMIN — MORPHINE SULFATE 4 MILLIGRAM(S): 50 CAPSULE, EXTENDED RELEASE ORAL at 05:59

## 2022-10-30 RX ADMIN — ONDANSETRON 4 MILLIGRAM(S): 8 TABLET, FILM COATED ORAL at 03:03

## 2022-10-30 RX ADMIN — Medication 10 MILLIGRAM(S): at 13:07

## 2022-10-30 RX ADMIN — ONDANSETRON 4 MILLIGRAM(S): 8 TABLET, FILM COATED ORAL at 11:27

## 2022-10-30 RX ADMIN — SODIUM CHLORIDE 1000 MILLILITER(S): 9 INJECTION INTRAMUSCULAR; INTRAVENOUS; SUBCUTANEOUS at 02:53

## 2022-10-30 RX ADMIN — FENTANYL CITRATE 50 MICROGRAM(S): 50 INJECTION INTRAVENOUS at 12:20

## 2022-10-30 RX ADMIN — Medication 15 MILLIGRAM(S): at 03:51

## 2022-10-30 NOTE — H&P ADULT - HISTORY OF PRESENT ILLNESS
MITCHELL EDWARD  36y  Female [id number]    HPI: 35yo  5w1d by embryo transfer date 10/8 presents with severe RLQ pain x 1 day. Pt received methotrexate yesterday (10/29) for confirmed ectopic pregnancy. Reports that she has been cramping intermittently all week and throughout the day, but she woke up this evening with severe stabbing pain which brought her to the ED. Pain worse with walking, movement. Reports the car ride was very uncomfortable. She took 1 zofran at home. Reports that this pain feels significantly worse than any cramping she's felt throughout this week. She denies nausea, vomiting, dizziness, lightheadedness.  Pt follows with VIELKA at [place] and was given methotrexate in their office outpatient.     Name of GYN Physician: Dr Khan, IVF = Dr Aguillon ([place])  POB:  PT  2020 at 36w, SAB x1  Pgyn: Denies fibroids, cysts, endometriosis, STI's. Reports abnormal pap smears in past, most recently normal.  PMH: GERD, migraines  PSH: s/p tonsil removal, sinus surgery  Home meds: Sertraline 50mg QD, Ubrevly PRN for migraines  All:  NKDA  Social History:  Denies smoking use, drug use, alcohol use.

## 2022-10-30 NOTE — ED PROVIDER NOTE - NSICDXPASTMEDICALHX_GEN_ALL_CORE_FT
PAST MEDICAL HISTORY:  Asthma childhood-no meds    Other iron deficiency anemia     Spontaneous  no d&c

## 2022-10-30 NOTE — ED PROVIDER NOTE - NSICDXPASTSURGICALHX_GEN_ALL_CORE_FT
PAST SURGICAL HISTORY:  Bilateral recurrent inguinal hernia without obstruction or gangrene as infant    History of sinus surgery 5yrs ago    History of third molar tooth extraction, unspecified edentulism class 2011    History of tonsillectomy age 9

## 2022-10-30 NOTE — CONSULT NOTE ADULT - CONSULT REASON
Patient's blood pressure is controlled  Will continue spironolactone 100mg  B i d    Will hold lisinopril for now as patient is very confused about the medications she is taking  Considering her blood pressure is controlled and her diabetes control is more important we will hold lisinopril for now until she demonstrates that she is compliant with her medications  Patient denies any side effects with medications  Patient educated on the importance of weight loss, and appropriate dieting  Patient admits to be compliant with medications  ectopic pregnancy

## 2022-10-30 NOTE — H&P ADULT - ASSESSMENT
37yo  5w1d by embryo transfer date 10/8, with Right ectopic pregnancy s/p outpatient methotrexate on 10/29 presents with severe RLQ pain x 1 day. Patient is hemodynamically stable. H/h normal. Physical exam with moderate RLQ tenderness on exam and TVUS showing L tubal ectopic with suspician of fetal pole with cardiac activity, no IUP and small pelvic free fluid with mild complexity.       -Neuro: IV pain medication prn  CV: Patient hemodynamically stable will monitor vital signs  Pulm: saturating well on room air   GI: NPO for OR   : Garrett to be placed intra-operatively.   Reproductive: -Ruptured ectopic pregnancy:  patient to go to OR for diagnostic laparoscopy, unilateral salpingectomy, possible unilateral oopherectomy, possible exploratory laparotomy.  Patient counseled on risks of surgery including bleeding, infection and damage to surrounding organs.  All questions/concerns of patient addressed. All consents signed with patient.    Heme: SCD's in OR for DVT ppx.  Aggressive and early ambulation post-operatively for DVT ppx.   ID: afebrile   FEN: LR@125.  Replete electolytes prn   Dispo: To OR for procedure as detailed above    patient seen and evaluated w Dr Nayla Salvador PGY2  Patient seen and d/w

## 2022-10-30 NOTE — BRIEF OPERATIVE NOTE - OPERATION/FINDINGS
EUA: An anteverted uterus approximately 8 weeks in size. Cervix not dilated.  Laparoscopy: Laparoscopic survey revealed a grossly normal uterus, left tube, left ovary, right ovary. Ectopic pregnancy noted in the right fallopian tube. 50cc of hemoperitoneum noted in the abdominal cavity.

## 2022-10-30 NOTE — ED PROVIDER NOTE - OBJECTIVE STATEMENT
35yo female pt A1 who presents to the ED for RLQ abdominal pain acute pain tonight. Patient coming in crying and uncomfortable 2/2 pain. Found with ectopic and given methotrexate today. Currently endorsing vaginal bleeding. follows up with Dr. Khan

## 2022-10-30 NOTE — H&P ADULT - NSHPROSALLOTHERNEGRD_GEN_ALL_CORE
Administered By (Optional): Graima Thacker Administered By (Optional): Garima Thacker All other review of systems negative, except as noted in HPI

## 2022-10-30 NOTE — CONSULT NOTE ADULT - ASSESSMENT
35yo  5w1d by embryo transfer date 10/8, with Right ectopic pregnancy s/p outpatient methotrexate on 10/29 presents with severe RLQ pain x 1 day. Patient is hemodynamically stable. H/h normal. Physical exam with moderate RLQ tenderness on exam and TVUS showing L tubal ectopic with suspician of fetal pole with cardiac activity, no IUP and small pelvic free fluid with mild complexity.     INCOMPLETE NOTE - final recs pending    S. Rutledge-Ernandez PGY2

## 2022-10-30 NOTE — H&P ADULT - NSHPPHYSICALEXAM_GEN_ALL_CORE
Vital Signs Last 24 Hrs  T(C): 36.8 (30 Oct 2022 02:37), Max: 36.8 (30 Oct 2022 02:37)  T(F): 98.2 (30 Oct 2022 02:37), Max: 98.2 (30 Oct 2022 02:37)  HR: 88 (30 Oct 2022 02:40) (88 - 111)  BP: 163/105 (30 Oct 2022 02:40) (163/105 - 190/111)  BP(mean): --  RR: 24 (30 Oct 2022 02:40) (22 - 24)  SpO2: 98% (30 Oct 2022 02:40) (98% - 100%)    Parameters below as of 30 Oct 2022 02:40  Patient On (Oxygen Delivery Method): room air        Physical Exam:   General: laying in bed, NAD, appears uncomfortable   CV: RRR  Lungs: CTA b/l  Abd: Soft, tender to deep palpation RLQ, suprapubic. No rebound, guarding.   :  No bleeding on pad.   Ext: non-tender b/l, no edema

## 2022-10-30 NOTE — DISCHARGE NOTE OB - PATIENT PORTAL LINK FT
You can access the FollowMyHealth Patient Portal offered by [facility] by registering at the following website: [email or web address]. By joining [employer]’s FollowMyHealth portal, you will also be able to view your health information using other applications (apps) compatible with our system.

## 2022-10-30 NOTE — DISCHARGE NOTE OB - CARE PROVIDER_API CALL
Burt Winslow)  Obstetrics and Gynecology  [address] [facility] [facility], [address] [place] [address]  Phone: [phone]  Fax: [phone]  Follow Up Time:

## 2022-10-30 NOTE — DISCHARGE NOTE OB - CARE PLAN
1 Principal Discharge DX:	 delivery delivered  Assessment and plan of treatment:	prt  section after failed myke

## 2022-10-30 NOTE — CHART NOTE - NSCHARTNOTEFT_GEN_A_CORE
R2 OBGYN POST-OP CHECK    S: Patient seen and evaluated at bedside.  Pt awake and alert resting comfortably in bed  Patient reports pain controlled with analgesia. Pt denies N/V, SOB, CP, palpitations, fever/chills. Tolerating clears.  +OOB. Voiding    O:   T(C): 36.8 (10-30-22 @ 10:40), Max: 36.8 (10-30-22 @ 10:40)  HR: 100 (10-30-22 @ 12:45) (86 - 100)  BP: 123/80 (10-30-22 @ 12:45) (116/67 - 138/86)  RR: 13 (10-30-22 @ 12:45) (11 - 19)  SpO2: 95% (10-30-22 @ 12:45) (94% - 100%)  Wt(kg): --  I&O's Summary    30 Oct 2022 07:01  -  30 Oct 2022 12:57  --------------------------------------------------------  IN: 240 mL / OUT: 500 mL / NET: -260 mL        Gen: Resting comfortably in bed, NAD  CV: S1S2, RRR  Lungs: CTA B/L  Abd: soft, appropriately tender, occasional BS x 4 quadrants.    Inc: 3 lsc port sites Clean/dry/intact w/ steri strip in place  Ext: SCD's in place and functional, non-tender b/l, no edema        A/P: 36y Female s/p RS and hemoperitoneum. Patient doing well psot operatively, meeting all post operative milestones    Neuro: PO Analgesia PRN  CV: Hemodynamically stable.    Pulm: Saturating well on room air.  Encourage OOB and incentive spirometer use.   GI: Advance to regular diet. Anti-emetics PRN.  : Voiding   Heme: DVT ppx w/ SCD's while in bed. Early ambulation, initially with assistance then as tolerated.   ID: Afebrile    Dispo: Discharge from PACU when criteria met.     Christiana PGY2

## 2022-10-30 NOTE — ASU DISCHARGE PLAN (ADULT/PEDIATRIC) - MEDICATION INSTRUCTIONS
600 mg of motrin every 6 hours as needed for pain, 975 mg of tylenol every 6 hours as needed for pain 5mg of oxycodone every 6 hours as needed for pain

## 2022-10-30 NOTE — ED ADULT NURSE NOTE - OBJECTIVE STATEMENT
Pt received in rm 18. C/o severe b/l pelvic pain 1x day. Pt was 6 wks pregnant, dx w/ ectopic pregnancy and was given methotrexate IM this morning. Pt also endorses nausea and dizziness. A&Ox4, ambulatory at baseline. Breathing even and unlabored. Arrived tachy to 110s. Pt appears uncomfortable, verbalized feeling scared. Denies chest pain, SOB, fever or chills. 20G IV placed on left AC. Labs drawn and sent. Pt medicated per MAR. EKG done. Pending urine samples. Waiting for US. Pt's  at bedside.

## 2022-10-30 NOTE — ASU DISCHARGE PLAN (ADULT/PEDIATRIC) - CARE PROVIDER_API CALL
Burt Winslow)  Obstetrics and Gynecology  [address], [address] [place] [address]  Phone: [phone]  Fax: [phone]  Follow Up Time: 2 weeks

## 2022-10-30 NOTE — ED ADULT NURSE REASSESSMENT NOTE - NS ED NURSE REASSESS COMMENT FT1
Report given to OR HEAVENLY Sanchez. Pt is A&Ox4, ambulatory at baseline. VSS. NAD. Belongings and valuable endorsed to  at bedside. Pt requesting to hold on to phone until ready to go to OR. Made aware the cellphone will have to be placed in a valuables envelope and sent to security. Pt verbalized understanding

## 2022-10-30 NOTE — ED PROVIDER NOTE - ATTENDING CONTRIBUTION TO CARE
35yo female pt A1 who presents to the ED for RLQ abdominal pain acute pain tonight. Patient coming in crying and uncomfortable 2/2 pain. Found with ectopic and given methotrexate today. Currently endorsing vaginal bleeding. follows up with

## 2022-10-30 NOTE — ASU DISCHARGE PLAN (ADULT/PEDIATRIC) - NS MD DC FALL RISK RISK
For information on Fall & Injury Prevention, visit: [email or web address] OR  [email or web address] OR  https://www.cdc.gov/steadi/patient.html

## 2022-10-30 NOTE — ED PROVIDER NOTE - PROGRESS NOTE DETAILS
FAST exam negative FAST exam negative, anh: ;labs wnl with no worsening anemia. pain managed. ob at bedside. symptomatically improved.

## 2022-10-30 NOTE — ASU DISCHARGE PLAN (ADULT/PEDIATRIC) - NURSING INSTRUCTIONS
You were given Tylenol in the operating the room at 10 am the next time you can take Tylenol is today at 4 pm.   Also you were given Oxycodone in the PACU at 11:30 am the next time you can take oxycodone at 5 30 pm

## 2022-10-30 NOTE — ED ADULT TRIAGE NOTE - CHIEF COMPLAINT QUOTE
c/o severe, sudden onset lower abd pain. pt 6weeks pregnant, , dx wit ectopic pregnancy yesterday and received methotrexate shots this morning. pt appears extremely uncomfortable.  pt brought directly to  18

## 2022-10-30 NOTE — DISCHARGE NOTE OB - NS MD DC FALL RISK RISK
For information on Fall & Injury Prevention, visit: [email or web address] OR  [email or web address] OR  https://www.cdc.gov/steadi/patient.html

## 2022-10-30 NOTE — ED PROVIDER NOTE - CLINICAL SUMMARY MEDICAL DECISION MAKING FREE TEXT BOX
37yo female pt A1 who was found with an ectopic and was administered methotrexate outpatient. coming in for pelvic pain since tonight and worsening. Began crampy in nature and a/w vaginal bleeding. Concerning for ruptured ectopic at this time. Will assess with blood work and US. Patient VS stable at this time and with a negative FAST.

## 2022-10-30 NOTE — ED PROVIDER NOTE - PHYSICAL EXAMINATION
GENERAL: Awake, alert, teary on exam  HEENT: NC/AT, moist mucous membranes, EOMI  LUNGS: CTAB, no wheezes or crackles   CARDIAC: RRR, no m/r/g  ABDOMEN: Soft, RLQ abdominal tenderness to palpation w guarding  EXT: No edema, no calf tenderness, no deformities.  NEURO: A&Ox3. Moving all extremities.  SKIN: Warm and dry. No rash.  PSYCH: Normal affect.

## 2022-10-30 NOTE — CONSULT NOTE ADULT - SUBJECTIVE AND OBJECTIVE BOX
MITCHELL EDWARD  36y  Female [id number]    HPI: 35yo  5w1d by embryo transfer date 10/8 presents with severe RLQ pain x 1 day. Pt received methotrexate yesterday (10/29) for confirmed ectopic pregnancy. Reports that she has been cramping intermittently all week and throughout the day, but she woke up this evening with severe stabbing pain which brought her to the ED. Pain worse with walking, movement. Reports the car ride was very uncomfortable. She took 1 zofran at home. Reports that this pain feels significantly worse than any cramping she's felt throughout this week. She denies nausea, vomiting, dizziness, lightheadedness.  Pt follows with VIELKA at [place] and was given methotrexate in their office outpatient.     Name of GYN Physician: Dr Khan, IVF = Dr Aguillon ([place])  POB:  PT  2020 at 36w, SAB x1  Pgyn: Denies fibroids, cysts, endometriosis, STI's. Reports abnormal pap smears in past, most recently normal.  PMH: GERD, migraines  PSH: s/p tonsil removal, sinus surgery  Home meds: Sertraline 50mg QD, Ubrevly PRN for migraines  All:  NKDA  Social History:  Denies smoking use, drug use, alcohol use.    Vital Signs Last 24 Hrs  T(C): 36.8 (30 Oct 2022 02:37), Max: 36.8 (30 Oct 2022 02:37)  T(F): 98.2 (30 Oct 2022 02:37), Max: 98.2 (30 Oct 2022 02:37)  HR: 88 (30 Oct 2022 02:40) (88 - 111)  BP: 163/105 (30 Oct 2022 02:40) (163/105 - 190/111)  BP(mean): --  RR: 24 (30 Oct 2022 02:40) (22 - 24)  SpO2: 98% (30 Oct 2022 02:40) (98% - 100%)    Parameters below as of 30 Oct 2022 02:40  Patient On (Oxygen Delivery Method): room air        Physical Exam:   General: laying in bed, NAD, appears uncomfortable   CV: RRR  Lungs: CTA b/l  Abd: Soft, tender to deep palpation RLQ, suprapubic. No rebound, guarding.   :  No bleeding on pad.   Ext: non-tender b/l, no edema     LABS:                              13.4   10.52 )-----------( 323      ( 30 Oct 2022 02:40 )             38.5     10-30    138  |  104  |  15  ----------------------------<  126<H>  3.9   |  23  |  0.69    Ca    9.6      30 Oct 2022 02:40    TPro  7.1  /  Alb  3.9  /  TBili  0.2  /  DBili  x   /  AST  15  /  ALT  12  /  AlkPhos  76  10-30    I&O's Detail          RADIOLOGY & ADDITIONAL STUDIES:  < from: US Transvaginal, OB (10.30.22 @ 05:17) >  ACC: [id number] EXAM:  US OB TRANSVAGINAL                          PROCEDURE DATE:  10/30/2022          INTERPRETATION:  CLINICAL INFORMATION: Abdominal pain and history of   ectopic pregnancy status post methotrexate treatment on . IVF   transfer 10/8/2022.    COMPARISON: None available.    Endovaginal pelvic sonogram only. Color and Spectral Doppler was   performed.    FINDINGS:  Uterus: 8.4 x 4.6 x 5.4 cm. No intrauterine gestation.  Endometrium: 4 mm.    Right ovary: 3.5 cm x 2.7 cm x 2.8cm, inclusive of a 2.0 cm simple and   2.0 cm complex cysts. Normal arterial and venous waveforms. Ringlike   echogenic structure within the right adnexa, separate from the ovary,   which contains what appears to be a yolk sac and possible fetal polewith   cardiac activity.  Left ovary: 3.5 cm x 2.6 cm x 3.0 cm, inclusive of 2.1 cm simple and 2.2   cm complex cysts. Normal arterial and venous waveforms.    Fluid: Small volume pelvic fluid, with mild complexity adjacent to the   right adnexa.    IMPRESSION:    Left tubal ectopic pregnancy, with suspicion of a fetal pole   demonstrating cardiac activity.    No intrauterine pregnancy.    Small pelvic free fluid, with mild complexity in the region of the right   adnexa. Rupture cannot be excluded.    Preliminary findings were discussed by Dr. Stone with  Dr. Bull, of   the ED on 10/30/2022 5:31 AM with read back confirmation.    --- End of Report ---          KARLIE STONE MD; Resident Radiologist  This document has been electronically signed.  MIKEY CAROLINA MD; Attending Radiologist  This document has been electronically signed. Oct 30 2022  6:21AM    < end of copied text >   *documentation delayed due to clinical duties*     MITCHELL EDWARD  36y  Female [id number]    HPI: 37yo  5w1d by embryo transfer date 10/8 presents with severe RLQ pain x 1 day. Pt received methotrexate yesterday (10/29) for confirmed ectopic pregnancy. Reports that she has been cramping intermittently all week and throughout the day, but she woke up this evening with severe stabbing pain which brought her to the ED. Pain worse with walking, movement. Reports the car ride was very uncomfortable. She took 1 zofran at home. Reports that this pain feels significantly worse than any cramping she's felt throughout this week. She denies nausea, vomiting, dizziness, lightheadedness.  Pt follows with VIELKA at [place] and was given methotrexate in their office outpatient.     Name of GYN Physician: Dr Khan, IVF = Dr Aguillon ([place])  POB:  PT  2020 at 36w, SAB x1  Pgyn: Denies fibroids, cysts, endometriosis, STI's. Reports abnormal pap smears in past, most recently normal.  PMH: GERD, migraines  PSH: s/p tonsil removal, sinus surgery  Home meds: Sertraline 50mg QD, Ubrevly PRN for migraines  All:  NKDA  Social History:  Denies smoking use, drug use, alcohol use.    Vital Signs Last 24 Hrs  T(C): 36.8 (30 Oct 2022 02:37), Max: 36.8 (30 Oct 2022 02:37)  T(F): 98.2 (30 Oct 2022 02:37), Max: 98.2 (30 Oct 2022 02:37)  HR: 88 (30 Oct 2022 02:40) (88 - 111)  BP: 163/105 (30 Oct 2022 02:40) (163/105 - 190/111)  BP(mean): --  RR: 24 (30 Oct 2022 02:40) (22 - 24)  SpO2: 98% (30 Oct 2022 02:40) (98% - 100%)    Parameters below as of 30 Oct 2022 02:40  Patient On (Oxygen Delivery Method): room air        Physical Exam:   General: laying in bed, NAD, appears uncomfortable   CV: RRR  Lungs: CTA b/l  Abd: Soft, tender to deep palpation RLQ, suprapubic. No rebound, guarding.   :  No bleeding on pad.   Ext: non-tender b/l, no edema     LABS:                              13.4   10.52 )-----------( 323      ( 30 Oct 2022 02:40 )             38.5     10-30    138  |  104  |  15  ----------------------------<  126<H>  3.9   |  23  |  0.69    Ca    9.6      30 Oct 2022 02:40    TPro  7.1  /  Alb  3.9  /  TBili  0.2  /  DBili  x   /  AST  15  /  ALT  12  /  AlkPhos  76  10-30    I&O's Detail          RADIOLOGY & ADDITIONAL STUDIES:  < from: US Transvaginal, OB (10.30.22 @ 05:17) >  ACC: [id number] EXAM:  US OB TRANSVAGINAL                          PROCEDURE DATE:  10/30/2022          INTERPRETATION:  CLINICAL INFORMATION: Abdominal pain and history of   ectopic pregnancy status post methotrexate treatment on . IVF   transfer 10/8/2022.    COMPARISON: None available.    Endovaginal pelvic sonogram only. Color and Spectral Doppler was   performed.    FINDINGS:  Uterus: 8.4 x 4.6 x 5.4 cm. No intrauterine gestation.  Endometrium: 4 mm.    Right ovary: 3.5 cm x 2.7 cm x 2.8cm, inclusive of a 2.0 cm simple and   2.0 cm complex cysts. Normal arterial and venous waveforms. Ringlike   echogenic structure within the right adnexa, separate from the ovary,   which contains what appears to be a yolk sac and possible fetal polewith   cardiac activity.  Left ovary: 3.5 cm x 2.6 cm x 3.0 cm, inclusive of 2.1 cm simple and 2.2   cm complex cysts. Normal arterial and venous waveforms.    Fluid: Small volume pelvic fluid, with mild complexity adjacent to the   right adnexa.    IMPRESSION:    Left tubal ectopic pregnancy, with suspicion of a fetal pole   demonstrating cardiac activity.    No intrauterine pregnancy.    Small pelvic free fluid, with mild complexity in the region of the right   adnexa. Rupture cannot be excluded.    Preliminary findings were discussed by Dr. Stone with  Dr. Bull, of   the ED on 10/30/2022 5:31 AM with read back confirmation.    --- End of Report ---          KARLIE STONE MD; Resident Radiologist  This document has been electronically signed.  MIKEY CAROLINA MD; Attending Radiologist  This document has been electronically signed. Oct 30 2022  6:21AM    < end of copied text >

## 2022-11-01 ENCOUNTER — NON-APPOINTMENT (OUTPATIENT)
Age: 37
End: 2022-11-01

## 2022-11-04 LAB — SURGICAL PATHOLOGY STUDY: SIGNIFICANT CHANGE UP

## 2022-11-14 ENCOUNTER — APPOINTMENT (OUTPATIENT)
Dept: OBGYN | Facility: CLINIC | Age: 37
End: 2022-11-14

## 2022-11-14 VITALS
DIASTOLIC BLOOD PRESSURE: 84 MMHG | WEIGHT: 125 LBS | SYSTOLIC BLOOD PRESSURE: 140 MMHG | BODY MASS INDEX: 22.15 KG/M2 | HEIGHT: 63 IN

## 2022-11-14 PROCEDURE — 99024 POSTOP FOLLOW-UP VISIT: CPT

## 2022-12-20 ENCOUNTER — APPOINTMENT (OUTPATIENT)
Dept: OBGYN | Facility: CLINIC | Age: 37
End: 2022-12-20

## 2022-12-20 VITALS
HEIGHT: 63 IN | BODY MASS INDEX: 23.21 KG/M2 | WEIGHT: 131 LBS | SYSTOLIC BLOOD PRESSURE: 118 MMHG | DIASTOLIC BLOOD PRESSURE: 74 MMHG

## 2022-12-20 DIAGNOSIS — Z87.59 PERSONAL HISTORY OF OTHER COMPLICATIONS OF PREGNANCY, CHILDBIRTH AND THE PUERPERIUM: ICD-10-CM

## 2022-12-20 DIAGNOSIS — Z01.419 ENCOUNTER FOR GYNECOLOGICAL EXAMINATION (GENERAL) (ROUTINE) W/OUT ABNORMAL FINDINGS: ICD-10-CM

## 2022-12-20 DIAGNOSIS — Z09 ENCOUNTER FOR FOLLOW-UP EXAMINATION AFTER COMPLETED TREATMENT FOR CONDITIONS OTHER THAN MALIGNANT NEOPLASM: ICD-10-CM

## 2022-12-20 PROCEDURE — 99395 PREV VISIT EST AGE 18-39: CPT

## 2022-12-20 NOTE — HISTORY OF PRESENT ILLNESS
[FreeTextEntry1] : Check up.  Feels well.  About to start IVF.  Refuses sti screen.  Irregu;ar menses.

## 2022-12-21 ENCOUNTER — NON-APPOINTMENT (OUTPATIENT)
Age: 37
End: 2022-12-21

## 2022-12-21 LAB — HPV HIGH+LOW RISK DNA PNL CVX: NOT DETECTED

## 2022-12-22 ENCOUNTER — NON-APPOINTMENT (OUTPATIENT)
Age: 37
End: 2022-12-22

## 2023-01-01 NOTE — OB RN PATIENT PROFILE - WEIGHT METHOD
Well Visit- 2 month         Subjective:  History was provided by the mother. Tierra Estes is a 2 m.o. male here for 2 month [address]. Guardian: mother  Guardian Marital Status: single  Who lives in the home: Mother and Siblings    Concerns:  Current concerns on the part of [address] [place], [place] [address]Orlando Paul's mother include none. Common ambulatory SmartLinks:      Immunization History   Administered Date(s) Administered    Hep B, ENGERIX-B, RECOMBIVAX-HB, (age Birth - 22y), IM, 0.5mL 2023         Nutrition:  Water supply: city  Feeding:        DURING THE DAY:  bottle - Enfamil-  6 ounces of formula every 1-2  hours. DURING THE NIGHT:  bottle - Enfamil-  6 ounces of formula every 3 hours. Feeding concerns: none. Urine output:  30 wet diapers in 24 hours  Stool output:  1-2 stools in 24 hours      Safety:  Sleep: Patient sleeps no. He falls asleep on his/her own in crib. He is sleeping 10 hours at a time, 10 hours/day. Working smoke detector: yes  Working CO detector: no  Appropriate car seat use: yes  Pets in the home: no  Firearms in home: no      Developmental Surveillance/ CDC milestones form (by report or observation):    Social/Emotional:        Has begun to smile at people: yes        Can briefly comfort him/herself (ex: by sucking on hand): yes        Tries to look at parent: yes       Language/Communication:        [place], makes gurgling sounds: yes        Turns head toward sounds: yes       Cognitive:         Pays attention to faces: yes         Begins to follow things with eyes and recognize things at a distance: yes         Begins to act bored if activity doesn't change: yes          Movement/Physical development:         Can hold head up and begin to push when laying on tummy: yes         Makes smoother movements with arms and legs: yes        Social Determinants of Health:  Do you have everything you need to take care of baby? Yes  Are there any problems with your current living situation?  no  Within stated

## 2023-01-06 LAB — CYTOLOGY CVX/VAG DOC THIN PREP: NORMAL

## 2023-03-02 ENCOUNTER — APPOINTMENT (OUTPATIENT)
Dept: DERMATOLOGY | Facility: CLINIC | Age: 38
End: 2023-03-02

## 2023-04-21 NOTE — OB RN TRIAGE NOTE - NS_FETALHEARTRATE_OBGYN_ALL_OB_FT
158
Fluid sent for chemistry/Fluid sent for gram stain and culture
Fluid sent for chemistry/Fluid sent for cytology/Fluid sent for gram stain and culture

## 2023-07-21 NOTE — OB RN PATIENT PROFILE - NS PRO TALK SOMEONE YN

## 2023-08-08 ENCOUNTER — APPOINTMENT (OUTPATIENT)
Dept: OBGYN | Facility: CLINIC | Age: 38
End: 2023-08-08
Payer: COMMERCIAL

## 2023-08-08 VITALS
BODY MASS INDEX: 23.57 KG/M2 | HEIGHT: 63 IN | SYSTOLIC BLOOD PRESSURE: 126 MMHG | DIASTOLIC BLOOD PRESSURE: 80 MMHG | WEIGHT: 133 LBS

## 2023-08-08 PROCEDURE — 81025 URINE PREGNANCY TEST: CPT

## 2023-08-08 PROCEDURE — 81003 URINALYSIS AUTO W/O SCOPE: CPT | Mod: NC,QW

## 2023-08-08 PROCEDURE — 0500F INITIAL PRENATAL CARE VISIT: CPT

## 2023-08-08 PROCEDURE — 76817 TRANSVAGINAL US OBSTETRIC: CPT

## 2023-08-08 RX ORDER — TRAZODONE HYDROCHLORIDE 50 MG/1
50 TABLET ORAL
Refills: 0 | Status: DISCONTINUED | COMMUNITY
Start: 2021-08-20 | End: 2023-08-08

## 2023-08-08 RX ORDER — LETROZOLE TABLETS 2.5 MG/1
2.5 TABLET, FILM COATED ORAL DAILY
Qty: 5 | Refills: 2 | Status: DISCONTINUED | COMMUNITY
Start: 2021-11-09 | End: 2023-08-08

## 2023-08-08 RX ORDER — UBROGEPANT 100 MG/1
TABLET ORAL
Qty: 30 | Refills: 0 | Status: DISCONTINUED | COMMUNITY
Start: 2021-10-26 | End: 2023-08-08

## 2023-08-08 RX ORDER — VALACYCLOVIR 500 MG/1
500 TABLET, FILM COATED ORAL TWICE DAILY
Qty: 60 | Refills: 1 | Status: ACTIVE | COMMUNITY
Start: 2021-09-02

## 2023-08-08 RX ORDER — ASCORBIC ACID, CHOLECALCIFEROL, .ALPHA.-TOCOPHEROL ACETATE, DL-, PYRIDOXINE, FOLIC ACID, CYANOCOBALAMIN, CALCIUM, FERROUS FUMARATE, MAGNESIUM, DOCONEXENT 85; 200; 10; 25; 1; 12; 140; 27; 45; 300 [IU]/1; [IU]/1; [IU]/1; [IU]/1; MG/1; UG/1; MG/1; MG/1; MG/1; MG/1
27-0.6-0.4-3 CAPSULE, GELATIN COATED ORAL
Qty: 1 | Refills: 11 | Status: ACTIVE | COMMUNITY
Start: 2023-08-08 | End: 1900-01-01

## 2023-08-09 LAB
BILIRUB UR QL STRIP: NORMAL
C TRACH RRNA SPEC QL NAA+PROBE: NOT DETECTED
GLUCOSE UR-MCNC: NORMAL
HCG UR QL: 1 EU/DL
HCG UR QL: POSITIVE
HGB UR QL STRIP.AUTO: NORMAL
KETONES UR-MCNC: NORMAL
LEUKOCYTE ESTERASE UR QL STRIP: NORMAL
N GONORRHOEA RRNA SPEC QL NAA+PROBE: NOT DETECTED
NITRITE UR QL STRIP: NORMAL
PH UR STRIP: 6
PROT UR STRIP-MCNC: NORMAL
QUALITY CONTROL: YES
SOURCE AMPLIFICATION: NORMAL
SOURCE AMPLIFICATION: NORMAL
SP GR UR STRIP: 1.02
T VAGINALIS RRNA SPEC QL NAA+PROBE: NOT DETECTED

## 2023-08-18 LAB — BACTERIA UR CULT: NORMAL

## 2023-08-25 ENCOUNTER — APPOINTMENT (OUTPATIENT)
Dept: OBGYN | Facility: CLINIC | Age: 38
End: 2023-08-25
Payer: COMMERCIAL

## 2023-08-25 VITALS
DIASTOLIC BLOOD PRESSURE: 70 MMHG | WEIGHT: 133 LBS | BODY MASS INDEX: 23.57 KG/M2 | SYSTOLIC BLOOD PRESSURE: 110 MMHG | HEIGHT: 63 IN

## 2023-08-25 LAB
BILIRUB UR QL STRIP: NORMAL
GLUCOSE UR-MCNC: NORMAL
HCG UR QL: 1 EU/DL
HGB UR QL STRIP.AUTO: NORMAL
KETONES UR-MCNC: NORMAL
LEUKOCYTE ESTERASE UR QL STRIP: NORMAL
NITRITE UR QL STRIP: NORMAL
PH UR STRIP: 7
PROT UR STRIP-MCNC: 100
SP GR UR STRIP: 1.02

## 2023-08-25 PROCEDURE — 36415 COLL VENOUS BLD VENIPUNCTURE: CPT

## 2023-08-25 PROCEDURE — 76801 OB US < 14 WKS SINGLE FETUS: CPT

## 2023-08-25 PROCEDURE — 0502F SUBSEQUENT PRENATAL CARE: CPT

## 2023-08-26 LAB
ABO + RH PNL BLD: NORMAL
BLD GP AB SCN SERPL QL: NORMAL
HBV SURFACE AG SERPL QL IA: NONREACTIVE
HCT VFR BLD CALC: 37.9 %
HCV AB SER QL: NONREACTIVE
HCV S/CO RATIO: 0.16 S/CO
HGB BLD-MCNC: 13.1 G/DL
HIV1+2 AB SPEC QL IA.RAPID: NONREACTIVE
MCHC RBC-ENTMCNC: 31.1 PG
MCHC RBC-ENTMCNC: 34.6 GM/DL
MCV RBC AUTO: 90 FL
PLATELET # BLD AUTO: 264 K/UL
RBC # BLD: 4.21 M/UL
RBC # FLD: 12.6 %
TSH SERPL-ACNC: 0.18 UIU/ML
WBC # FLD AUTO: 9.32 K/UL

## 2023-08-29 ENCOUNTER — NON-APPOINTMENT (OUTPATIENT)
Age: 38
End: 2023-08-29

## 2023-09-01 LAB
B19V IGG SER QL IA: 7.38 INDEX
B19V IGG+IGM SER-IMP: NORMAL
B19V IGG+IGM SER-IMP: POSITIVE
B19V IGM FLD-ACNC: 0.2 INDEX
B19V IGM SER-ACNC: NEGATIVE
HGB A MFR BLD: 97.3 %
HGB A2 MFR BLD: 2.7 %
HGB FRACT BLD-IMP: NORMAL
LEAD BLD-MCNC: <1 UG/DL
MEV IGG FLD QL IA: >300 AU/ML
MEV IGG+IGM SER-IMP: POSITIVE
RUBV IGG FLD-ACNC: 2 INDEX
RUBV IGG SER-IMP: POSITIVE
T PALLIDUM AB SER QL IA: NEGATIVE
VZV AB TITR SER: POSITIVE
VZV IGG SER IF-ACNC: 391.6 INDEX

## 2023-09-19 ENCOUNTER — APPOINTMENT (OUTPATIENT)
Dept: OBGYN | Facility: CLINIC | Age: 38
End: 2023-09-19
Payer: COMMERCIAL

## 2023-09-19 VITALS
DIASTOLIC BLOOD PRESSURE: 80 MMHG | HEIGHT: 63 IN | WEIGHT: 134 LBS | SYSTOLIC BLOOD PRESSURE: 120 MMHG | BODY MASS INDEX: 23.74 KG/M2

## 2023-09-19 LAB
BILIRUB UR QL STRIP: NORMAL
GLUCOSE UR-MCNC: NORMAL
HCG UR QL: 1 EU/DL
HGB UR QL STRIP.AUTO: NORMAL
KETONES UR-MCNC: NORMAL
LEUKOCYTE ESTERASE UR QL STRIP: NORMAL
NITRITE UR QL STRIP: NORMAL
PH UR STRIP: 7
PROT UR STRIP-MCNC: NORMAL
SP GR UR STRIP: 1.02

## 2023-09-19 PROCEDURE — 0502F SUBSEQUENT PRENATAL CARE: CPT

## 2023-09-19 PROCEDURE — 36415 COLL VENOUS BLD VENIPUNCTURE: CPT

## 2023-09-20 LAB — TSH SERPL-ACNC: 0.67 UIU/ML

## 2023-09-25 LAB
AFP MOM: 0.87
AFP VALUE: 28.8 NG/ML
ALPHA FETOPROTEIN SERUM COMMENT: NORMAL
ALPHA FETOPROTEIN SERUM INTERPRETATION: NORMAL
ALPHA FETOPROTEIN SERUM RESULTS: NORMAL
ALPHA FETOPROTEIN SERUM TEST RESULTS: NORMAL
GESTATIONAL AGE BASED ON: NORMAL
GESTATIONAL AGE ON COLLECTION DATE: 15.3 WEEKS
INSULIN DEP DIABETES: NO
MATERNAL AGE AT EDD AFP: 37.2 YR
MULTIPLE GESTATION: NO
OSBR RISK 1 IN: NORMAL
RACE: NORMAL
WEIGHT AFP: 134 LBS

## 2023-09-30 ENCOUNTER — NON-APPOINTMENT (OUTPATIENT)
Age: 38
End: 2023-09-30

## 2023-10-02 ENCOUNTER — NON-APPOINTMENT (OUTPATIENT)
Age: 38
End: 2023-10-02

## 2023-10-24 ENCOUNTER — APPOINTMENT (OUTPATIENT)
Dept: ANTEPARTUM | Facility: CLINIC | Age: 38
End: 2023-10-24
Payer: COMMERCIAL

## 2023-10-24 ENCOUNTER — NON-APPOINTMENT (OUTPATIENT)
Age: 38
End: 2023-10-24

## 2023-10-24 ENCOUNTER — ASOB RESULT (OUTPATIENT)
Age: 38
End: 2023-10-24

## 2023-10-24 PROCEDURE — 76811 OB US DETAILED SNGL FETUS: CPT

## 2023-10-24 PROCEDURE — 76817 TRANSVAGINAL US OBSTETRIC: CPT

## 2023-10-27 ENCOUNTER — NON-APPOINTMENT (OUTPATIENT)
Age: 38
End: 2023-10-27

## 2023-11-03 ENCOUNTER — APPOINTMENT (OUTPATIENT)
Dept: OBGYN | Facility: CLINIC | Age: 38
End: 2023-11-03
Payer: COMMERCIAL

## 2023-11-03 VITALS
SYSTOLIC BLOOD PRESSURE: 110 MMHG | HEIGHT: 63 IN | WEIGHT: 136 LBS | DIASTOLIC BLOOD PRESSURE: 70 MMHG | BODY MASS INDEX: 24.1 KG/M2

## 2023-11-03 LAB
BILIRUB UR QL STRIP: NORMAL
GLUCOSE UR-MCNC: NORMAL
HCG UR QL: 0.2 EU/DL
HGB UR QL STRIP.AUTO: NORMAL
KETONES UR-MCNC: NORMAL
LEUKOCYTE ESTERASE UR QL STRIP: NORMAL
NITRITE UR QL STRIP: NORMAL
PH UR STRIP: 7
PROT UR STRIP-MCNC: NORMAL
SP GR UR STRIP: 1.02

## 2023-11-03 PROCEDURE — 0502F SUBSEQUENT PRENATAL CARE: CPT

## 2023-11-10 ENCOUNTER — NON-APPOINTMENT (OUTPATIENT)
Age: 38
End: 2023-11-10

## 2023-11-20 ENCOUNTER — APPOINTMENT (OUTPATIENT)
Dept: PEDIATRIC CARDIOLOGY | Facility: CLINIC | Age: 38
End: 2023-11-20
Payer: COMMERCIAL

## 2023-11-20 PROCEDURE — 93325 DOPPLER ECHO COLOR FLOW MAPG: CPT | Mod: 59

## 2023-11-20 PROCEDURE — 76821 MIDDLE CEREBRAL ARTERY ECHO: CPT

## 2023-11-20 PROCEDURE — 76827 ECHO EXAM OF FETAL HEART: CPT

## 2023-11-20 PROCEDURE — 76825 ECHO EXAM OF FETAL HEART: CPT

## 2023-11-20 PROCEDURE — 76820 UMBILICAL ARTERY ECHO: CPT

## 2023-11-20 PROCEDURE — 99202 OFFICE O/P NEW SF 15 MIN: CPT | Mod: 25

## 2023-11-21 ENCOUNTER — APPOINTMENT (OUTPATIENT)
Dept: OBGYN | Facility: CLINIC | Age: 38
End: 2023-11-21
Payer: COMMERCIAL

## 2023-11-21 VITALS
SYSTOLIC BLOOD PRESSURE: 124 MMHG | WEIGHT: 138 LBS | DIASTOLIC BLOOD PRESSURE: 70 MMHG | HEIGHT: 63 IN | BODY MASS INDEX: 24.45 KG/M2

## 2023-11-21 PROCEDURE — 36415 COLL VENOUS BLD VENIPUNCTURE: CPT

## 2023-11-21 PROCEDURE — 0502F SUBSEQUENT PRENATAL CARE: CPT

## 2023-11-21 RX ORDER — PANTOPRAZOLE 40 MG/1
40 TABLET, DELAYED RELEASE ORAL TWICE DAILY
Qty: 180 | Refills: 1 | Status: ACTIVE | COMMUNITY
Start: 2023-11-21 | End: 1900-01-01

## 2023-11-26 ENCOUNTER — NON-APPOINTMENT (OUTPATIENT)
Age: 38
End: 2023-11-26

## 2023-11-26 LAB
BASOPHILS # BLD AUTO: 0.02 K/UL
BASOPHILS NFR BLD AUTO: 0.2 %
EOSINOPHIL # BLD AUTO: 0.08 K/UL
EOSINOPHIL NFR BLD AUTO: 0.7 %
GLUCOSE 1H P 50 G GLC PO SERPL-MCNC: 143 MG/DL
HCT VFR BLD CALC: 29.7 %
HGB BLD-MCNC: 10.3 G/DL
IMM GRANULOCYTES NFR BLD AUTO: 0.3 %
LYMPHOCYTES # BLD AUTO: 1.26 K/UL
LYMPHOCYTES NFR BLD AUTO: 10.4 %
MAN DIFF?: NORMAL
MCHC RBC-ENTMCNC: 32.3 PG
MCHC RBC-ENTMCNC: 34.7 GM/DL
MCV RBC AUTO: 93.1 FL
MONOCYTES # BLD AUTO: 0.48 K/UL
MONOCYTES NFR BLD AUTO: 4 %
NEUTROPHILS # BLD AUTO: 10.19 K/UL
NEUTROPHILS NFR BLD AUTO: 84.4 %
PLATELET # BLD AUTO: 298 K/UL
RBC # BLD: 3.19 M/UL
RBC # FLD: 14.2 %
T PALLIDUM AB SER QL IA: NEGATIVE
WBC # FLD AUTO: 12.07 K/UL

## 2023-12-01 ENCOUNTER — APPOINTMENT (OUTPATIENT)
Dept: OBGYN | Facility: CLINIC | Age: 38
End: 2023-12-01

## 2023-12-12 ENCOUNTER — NON-APPOINTMENT (OUTPATIENT)
Age: 38
End: 2023-12-12

## 2023-12-19 ENCOUNTER — APPOINTMENT (OUTPATIENT)
Dept: ANTEPARTUM | Facility: CLINIC | Age: 38
End: 2023-12-19
Payer: COMMERCIAL

## 2023-12-19 ENCOUNTER — ASOB RESULT (OUTPATIENT)
Age: 38
End: 2023-12-19

## 2023-12-19 ENCOUNTER — APPOINTMENT (OUTPATIENT)
Dept: OBGYN | Facility: CLINIC | Age: 38
End: 2023-12-19
Payer: COMMERCIAL

## 2023-12-19 VITALS
HEIGHT: 63 IN | WEIGHT: 145 LBS | SYSTOLIC BLOOD PRESSURE: 100 MMHG | BODY MASS INDEX: 25.69 KG/M2 | DIASTOLIC BLOOD PRESSURE: 60 MMHG

## 2023-12-19 PROCEDURE — 76817 TRANSVAGINAL US OBSTETRIC: CPT

## 2023-12-19 PROCEDURE — 0502F SUBSEQUENT PRENATAL CARE: CPT

## 2023-12-19 PROCEDURE — 76816 OB US FOLLOW-UP PER FETUS: CPT

## 2024-01-02 ENCOUNTER — APPOINTMENT (OUTPATIENT)
Dept: OBGYN | Facility: CLINIC | Age: 39
End: 2024-01-02
Payer: COMMERCIAL

## 2024-01-02 VITALS
SYSTOLIC BLOOD PRESSURE: 120 MMHG | BODY MASS INDEX: 25.16 KG/M2 | HEIGHT: 63 IN | WEIGHT: 142 LBS | DIASTOLIC BLOOD PRESSURE: 72 MMHG

## 2024-01-02 PROCEDURE — 90471 IMMUNIZATION ADMIN: CPT

## 2024-01-02 PROCEDURE — 0502F SUBSEQUENT PRENATAL CARE: CPT

## 2024-01-02 PROCEDURE — 90715 TDAP VACCINE 7 YRS/> IM: CPT

## 2024-01-22 ENCOUNTER — APPOINTMENT (OUTPATIENT)
Dept: OBGYN | Facility: CLINIC | Age: 39
End: 2024-01-22
Payer: COMMERCIAL

## 2024-01-22 VITALS
HEIGHT: 63 IN | BODY MASS INDEX: 25.69 KG/M2 | WEIGHT: 145 LBS | DIASTOLIC BLOOD PRESSURE: 70 MMHG | SYSTOLIC BLOOD PRESSURE: 116 MMHG

## 2024-01-22 LAB
BILIRUB UR QL STRIP: NORMAL
GLUCOSE UR-MCNC: NORMAL
HCG UR QL: 0.2 EU/DL
HGB UR QL STRIP.AUTO: NORMAL
KETONES UR-MCNC: NORMAL
LEUKOCYTE ESTERASE UR QL STRIP: NORMAL
NITRITE UR QL STRIP: NORMAL
PH UR STRIP: 6
PROT UR STRIP-MCNC: 30
SP GR UR STRIP: 1.02

## 2024-01-22 PROCEDURE — 0502F SUBSEQUENT PRENATAL CARE: CPT

## 2024-01-22 PROCEDURE — 76816 OB US FOLLOW-UP PER FETUS: CPT

## 2024-02-07 ENCOUNTER — NON-APPOINTMENT (OUTPATIENT)
Age: 39
End: 2024-02-07

## 2024-02-08 ENCOUNTER — APPOINTMENT (OUTPATIENT)
Dept: ANTEPARTUM | Facility: CLINIC | Age: 39
End: 2024-02-08

## 2024-02-08 ENCOUNTER — NON-APPOINTMENT (OUTPATIENT)
Age: 39
End: 2024-02-08

## 2024-02-08 ENCOUNTER — INPATIENT (INPATIENT)
Facility: HOSPITAL | Age: 39
LOS: 0 days | Discharge: HOME | End: 2024-02-09
Attending: OBSTETRICS & GYNECOLOGY | Admitting: OBSTETRICS & GYNECOLOGY
Payer: COMMERCIAL

## 2024-02-08 VITALS
SYSTOLIC BLOOD PRESSURE: 126 MMHG | HEART RATE: 96 BPM | TEMPERATURE: 98 F | RESPIRATION RATE: 16 BRPM | DIASTOLIC BLOOD PRESSURE: 88 MMHG

## 2024-02-08 DIAGNOSIS — Z98.890 OTHER SPECIFIED POSTPROCEDURAL STATES: Chronic | ICD-10-CM

## 2024-02-08 DIAGNOSIS — O26.899 OTHER SPECIFIED PREGNANCY RELATED CONDITIONS, UNSPECIFIED TRIMESTER: ICD-10-CM

## 2024-02-08 DIAGNOSIS — K08.409 PARTIAL LOSS OF TEETH, UNSPECIFIED CAUSE, UNSPECIFIED CLASS: Chronic | ICD-10-CM

## 2024-02-08 DIAGNOSIS — Z90.89 ACQUIRED ABSENCE OF OTHER ORGANS: Chronic | ICD-10-CM

## 2024-02-08 DIAGNOSIS — Z90.79 ACQUIRED ABSENCE OF OTHER GENITAL ORGAN(S): Chronic | ICD-10-CM

## 2024-02-08 DIAGNOSIS — K40.21 BILATERAL INGUINAL HERNIA, WITHOUT OBSTRUCTION OR GANGRENE, RECURRENT: Chronic | ICD-10-CM

## 2024-02-08 LAB
ALBUMIN SERPL ELPH-MCNC: 2.8 G/DL — LOW (ref 3.3–5)
ALP SERPL-CCNC: 128 U/L — HIGH (ref 40–120)
ALT FLD-CCNC: 10 U/L — SIGNIFICANT CHANGE UP (ref 4–33)
ANION GAP SERPL CALC-SCNC: 11 MMOL/L — SIGNIFICANT CHANGE UP (ref 7–14)
APPEARANCE UR: CLEAR — SIGNIFICANT CHANGE UP
APTT BLD: 28.7 SEC — SIGNIFICANT CHANGE UP (ref 24.5–35.6)
AST SERPL-CCNC: 14 U/L — SIGNIFICANT CHANGE UP (ref 4–32)
BACTERIA # UR AUTO: ABNORMAL /HPF
BASOPHILS # BLD AUTO: 0.02 K/UL — SIGNIFICANT CHANGE UP (ref 0–0.2)
BASOPHILS NFR BLD AUTO: 0.2 % — SIGNIFICANT CHANGE UP (ref 0–2)
BILIRUB SERPL-MCNC: 0.2 MG/DL — SIGNIFICANT CHANGE UP (ref 0.2–1.2)
BILIRUB UR-MCNC: NEGATIVE — SIGNIFICANT CHANGE UP
BLD GP AB SCN SERPL QL: NEGATIVE — SIGNIFICANT CHANGE UP
BUN SERPL-MCNC: 8 MG/DL — SIGNIFICANT CHANGE UP (ref 7–23)
CALCIUM SERPL-MCNC: 8.3 MG/DL — LOW (ref 8.4–10.5)
CAST: 0 /LPF — SIGNIFICANT CHANGE UP (ref 0–4)
CHLORIDE SERPL-SCNC: 106 MMOL/L — SIGNIFICANT CHANGE UP (ref 98–107)
CO2 SERPL-SCNC: 22 MMOL/L — SIGNIFICANT CHANGE UP (ref 22–31)
COLOR SPEC: YELLOW — SIGNIFICANT CHANGE UP
CREAT ?TM UR-MCNC: 106 MG/DL — SIGNIFICANT CHANGE UP
CREAT SERPL-MCNC: 0.6 MG/DL — SIGNIFICANT CHANGE UP (ref 0.5–1.3)
DIFF PNL FLD: NEGATIVE — SIGNIFICANT CHANGE UP
EGFR: 118 ML/MIN/1.73M2 — SIGNIFICANT CHANGE UP
EOSINOPHIL # BLD AUTO: 0.03 K/UL — SIGNIFICANT CHANGE UP (ref 0–0.5)
EOSINOPHIL NFR BLD AUTO: 0.3 % — SIGNIFICANT CHANGE UP (ref 0–6)
FIBRINOGEN PPP-MCNC: 322 MG/DL — SIGNIFICANT CHANGE UP (ref 200–465)
GLUCOSE SERPL-MCNC: 128 MG/DL — HIGH (ref 70–99)
GLUCOSE UR QL: NEGATIVE MG/DL — SIGNIFICANT CHANGE UP
HCT VFR BLD CALC: 30.6 % — LOW (ref 34.5–45)
HGB BLD-MCNC: 10.7 G/DL — LOW (ref 11.5–15.5)
IANC: 9.21 K/UL — HIGH (ref 1.8–7.4)
IMM GRANULOCYTES NFR BLD AUTO: 0.4 % — SIGNIFICANT CHANGE UP (ref 0–0.9)
INR BLD: 0.96 RATIO — SIGNIFICANT CHANGE UP (ref 0.85–1.18)
KETONES UR-MCNC: ABNORMAL MG/DL
LDH SERPL L TO P-CCNC: 193 U/L — SIGNIFICANT CHANGE UP (ref 135–225)
LEUKOCYTE ESTERASE UR-ACNC: ABNORMAL
LYMPHOCYTES # BLD AUTO: 1.44 K/UL — SIGNIFICANT CHANGE UP (ref 1–3.3)
LYMPHOCYTES # BLD AUTO: 12.9 % — LOW (ref 13–44)
MCHC RBC-ENTMCNC: 31.6 PG — SIGNIFICANT CHANGE UP (ref 27–34)
MCHC RBC-ENTMCNC: 35 GM/DL — SIGNIFICANT CHANGE UP (ref 32–36)
MCV RBC AUTO: 90.3 FL — SIGNIFICANT CHANGE UP (ref 80–100)
MONOCYTES # BLD AUTO: 0.45 K/UL — SIGNIFICANT CHANGE UP (ref 0–0.9)
MONOCYTES NFR BLD AUTO: 4 % — SIGNIFICANT CHANGE UP (ref 2–14)
NEUTROPHILS # BLD AUTO: 9.21 K/UL — HIGH (ref 1.8–7.4)
NEUTROPHILS NFR BLD AUTO: 82.2 % — HIGH (ref 43–77)
NITRITE UR-MCNC: NEGATIVE — SIGNIFICANT CHANGE UP
NRBC # BLD: 0 /100 WBCS — SIGNIFICANT CHANGE UP (ref 0–0)
NRBC # FLD: 0 K/UL — SIGNIFICANT CHANGE UP (ref 0–0)
PH UR: 7 — SIGNIFICANT CHANGE UP (ref 5–8)
PLATELET # BLD AUTO: 181 K/UL — SIGNIFICANT CHANGE UP (ref 150–400)
POTASSIUM SERPL-MCNC: 3.4 MMOL/L — LOW (ref 3.5–5.3)
POTASSIUM SERPL-SCNC: 3.4 MMOL/L — LOW (ref 3.5–5.3)
PROT ?TM UR-MCNC: 25 MG/DL — SIGNIFICANT CHANGE UP
PROT SERPL-MCNC: 5.9 G/DL — LOW (ref 6–8.3)
PROT UR-MCNC: NEGATIVE MG/DL — SIGNIFICANT CHANGE UP
PROT/CREAT UR-RTO: 0.2 RATIO — SIGNIFICANT CHANGE UP (ref 0–0.2)
PROTHROM AB SERPL-ACNC: 10.8 SEC — SIGNIFICANT CHANGE UP (ref 9.5–13)
RBC # BLD: 3.39 M/UL — LOW (ref 3.8–5.2)
RBC # FLD: 12.8 % — SIGNIFICANT CHANGE UP (ref 10.3–14.5)
RBC CASTS # UR COMP ASSIST: 0 /HPF — SIGNIFICANT CHANGE UP (ref 0–4)
REVIEW: SIGNIFICANT CHANGE UP
RH IG SCN BLD-IMP: POSITIVE — SIGNIFICANT CHANGE UP
SODIUM SERPL-SCNC: 139 MMOL/L — SIGNIFICANT CHANGE UP (ref 135–145)
SP GR SPEC: 1.02 — SIGNIFICANT CHANGE UP (ref 1–1.03)
SQUAMOUS # UR AUTO: 6 /HPF — HIGH (ref 0–5)
URATE SERPL-MCNC: 4.5 MG/DL — SIGNIFICANT CHANGE UP (ref 2.5–7)
UROBILINOGEN FLD QL: 0.2 MG/DL — SIGNIFICANT CHANGE UP (ref 0.2–1)
WBC # BLD: 11.2 K/UL — HIGH (ref 3.8–10.5)
WBC # FLD AUTO: 11.2 K/UL — HIGH (ref 3.8–10.5)
WBC UR QL: 4 /HPF — SIGNIFICANT CHANGE UP (ref 0–5)

## 2024-02-08 PROCEDURE — 99221 1ST HOSP IP/OBS SF/LOW 40: CPT

## 2024-02-08 RX ORDER — ALBUTEROL 90 UG/1
2 AEROSOL, METERED ORAL EVERY 6 HOURS
Refills: 0 | Status: DISCONTINUED | OUTPATIENT
Start: 2024-02-08 | End: 2024-02-09

## 2024-02-08 RX ORDER — ACETAMINOPHEN 500 MG
975 TABLET ORAL EVERY 6 HOURS
Refills: 0 | Status: DISCONTINUED | OUTPATIENT
Start: 2024-02-08 | End: 2024-02-09

## 2024-02-08 RX ORDER — INFLUENZA VIRUS VACCINE 15; 15; 15; 15 UG/.5ML; UG/.5ML; UG/.5ML; UG/.5ML
0.5 SUSPENSION INTRAMUSCULAR ONCE
Refills: 0 | Status: DISCONTINUED | OUTPATIENT
Start: 2024-02-08 | End: 2024-02-09

## 2024-02-08 RX ORDER — SODIUM CHLORIDE 9 MG/ML
1000 INJECTION, SOLUTION INTRAVENOUS
Refills: 0 | Status: DISCONTINUED | OUTPATIENT
Start: 2024-02-08 | End: 2024-02-09

## 2024-02-08 RX ORDER — CETIRIZINE HYDROCHLORIDE 10 MG/1
1 TABLET ORAL
Qty: 0 | Refills: 0 | DISCHARGE

## 2024-02-08 RX ORDER — ACETAMINOPHEN 500 MG
975 TABLET ORAL ONCE
Refills: 0 | Status: COMPLETED | OUTPATIENT
Start: 2024-02-08 | End: 2024-02-08

## 2024-02-08 RX ORDER — SERTRALINE 25 MG/1
100 TABLET, FILM COATED ORAL DAILY
Refills: 0 | Status: DISCONTINUED | OUTPATIENT
Start: 2024-02-08 | End: 2024-02-09

## 2024-02-08 RX ORDER — AER TRAVELER 0.5 G/1
1 SOLUTION RECTAL; TOPICAL
Refills: 0 | Status: DISCONTINUED | OUTPATIENT
Start: 2024-02-08 | End: 2024-02-09

## 2024-02-08 RX ADMIN — Medication 975 MILLIGRAM(S): at 23:34

## 2024-02-08 RX ADMIN — SODIUM CHLORIDE 125 MILLILITER(S): 9 INJECTION, SOLUTION INTRAVENOUS at 17:34

## 2024-02-08 RX ADMIN — Medication 975 MILLIGRAM(S): at 17:34

## 2024-02-08 RX ADMIN — Medication 1 APPLICATION(S): at 23:50

## 2024-02-08 RX ADMIN — AER TRAVELER 1 APPLICATION(S): 0.5 SOLUTION RECTAL; TOPICAL at 23:34

## 2024-02-08 NOTE — OB RN TRIAGE NOTE - MENTAL HEALTH CONDITIONS/SYMPTOMS, PROFILE
anxiety disorder takes Zoloft qd, no therapy/anxiety disorder pp depression, takes Zoloft qd, no therapy/anxiety disorder/depression

## 2024-02-08 NOTE — OB PROVIDER H&P - NS_PELVICEXAM_OBGYN_ALL_OB
Fetal Cardiology Consult    Date of Visit:   04/10/2020  Gestational Age: 25 weeks       Dear Dr. Pathak    I had the opportunity to meet with Payal today for a Fetal Cardiology Consult and Fetal Echocardiography.    Fetal Echo demonstrated Normal fetal cardiac anatomy. Normal right and left ventricular size and function. Fetal heart rate is regular at 153 bpm. No hydrops.    I have reviewed the normal Fetal Echo findings.       The parents had appropriate questions. I did my best to answer their questions.    Plan:    No follow-up is indicated    Thank you for allowing me to participate in Payal's care.  Feel free to contact me with questions.    I spend 10 minutes counseling the patient about her fetal echocardiogram findings. All of this time was face to face.    Dr Mary Cameron  Pediatric Cardiologist  [facility]  Phone [phone]    
Deferred

## 2024-02-08 NOTE — CONSULT NOTE ADULT - ASSESSMENT
MITCHELL EDWARD is a 38y (1985) RH woman with a PMHx significant for migraines, currently 35 weeks pregnant who presented to the ED for painful hemorrhoids and hypertension. Neurology consulted d/t new onset tingling/numbness. Patient stated that 2-3 days ago she began having tingling in the b/l LE followed by tingling in the LUE 1-2 days ago that has persisted. Additionally, patient has a minor dull headache which has been managed by tylenol and is not typical of her migraines.  As per primary team patient also had vision "spots" that were transient. She denied any prior similar episodes, weakness, changes in speech or coordination, bowel or bladder incontinence, perineal numbness, vomiting, photosensitivity, prior stroke. Her headache does not worsen with position.  Additionally, patient c/o burning while urinating. While in the hospital noted to be mildly hypertensive with sbp 140s. UA abnormal but neg for protein.      Impression: B/l LE tingling and LUE tingling with neurological exam negative for sensation changes. Otherwise non lateralizing neurological examination. C/f possible peripheral reversible etiologies such as peripheral neuropathy with lower c/f central causes as patients symptoms are difficult to localize. Low suspicion for VST however unable to definitively r/o at this time. Also clinical c/f pre-eclampsia given hypertensive episodes, vision changes albeit resolved) and headache.      Recommendations:  [] Given burning while urinating and abnormal UA would consider treating for UTI  [] Would obtain peripheral neuropathy work up: B1, B6, B12, folate, methylmalonic acid, homocysteine, Vit E, Heavy metal toxicology screen (lead, arsenic, mercury), RPR, West Nile IgG/IgM, Lyme IgG/IgM, ganglioside antibodies, HIV assay, TSH, Hba1c  [] If worsening symptoms would consider MRI brain, MRV brain, MR spine (AM team to discuss)   [] C/w tylenol for headache management   [] May consider EMG/NCS outpatient pending clinical improvement    Case to be seen by attending.  Note incomplete until finalized by attending signature/attestation.  MITCHELL EDWARD is a 38y (1985) RH woman with a PMHx significant for migraines, currently 35 weeks pregnant who presented to the ED for painful hemorrhoids and hypertension. Neurology consulted d/t new onset tingling/numbness. Patient stated that 2-3 days ago she began having tingling in the b/l LE followed by tingling in the LUE 1-2 days ago that has persisted. Additionally, patient has a minor dull headache which has been managed by tylenol and is not typical of her migraines.  As per primary team patient also had vision "spots" that were transient. She denied any prior similar episodes, weakness, changes in speech or coordination, bowel or bladder incontinence, perineal numbness, vomiting, photosensitivity, prior stroke. Her headache does not worsen with position.  Additionally, patient c/o burning while urinating. While in the hospital noted to be mildly hypertensive with sbp 140s. UA abnormal but neg for protein.      Impression: B/l LE tingling and LUE tingling with neurological exam negative for sensation changes. Otherwise non lateralizing neurological examination. C/f possible peripheral reversible etiologies such as peripheral neuropathy with lower c/f central causes as patients symptoms are difficult to localize. Low suspicion for VST however unable to definitively r/o at this time. Also clinical c/f pre-eclampsia given hypertensive episodes, vision changes albeit resolved) and headache.      Recommendations:  [] Given burning while urinating and abnormal UA would consider treating for UTI  [] Would obtain peripheral neuropathy work up: B1, B6, B12, folate, methylmalonic acid, homocysteine, Vit E, Heavy metal toxicology screen (lead, arsenic, mercury), RPR, West Nile IgG/IgM, Lyme IgG/IgM, ganglioside antibodies, HIV assay, TSH, Hba1c  [] If worsening symptoms would consider MRI brain, MRV brain, MR spine (AM team to discuss)   [] C/w tylenol for headache management       Case to be seen by attending.  Note incomplete until finalized by attending signature/attestation.

## 2024-02-08 NOTE — OB RN TRIAGE NOTE - FALL HARM RISK - UNIVERSAL INTERVENTIONS
Bed in lowest position, wheels locked, appropriate side rails in place/Call bell, personal items and telephone in reach/Instruct patient to call for assistance before getting out of bed or chair/Non-slip footwear when patient is out of bed/Antoine to call system/Physically safe environment - no spills, clutter or unnecessary equipment/Purposeful Proactive Rounding/Room/bathroom lighting operational, light cord in reach

## 2024-02-08 NOTE — OB RN TRIAGE NOTE - NS_OBGYNHISTORY_OBGYN_ALL_OB_FT
Ectopic - Right salpingectomy     IVf pregnancy  Ectopic - Right salpingectomy    2020 36wk PROM female 7#2 IVf pregnancy   sab x1  Ectopic - Right salpingectomy    2020 36wk PROM female 7#2

## 2024-02-08 NOTE — OB RN PATIENT PROFILE - NSSDOHHELPREAD_OBGYN_A_OB
Chief Complaint   Patient presents with   â¢ Establish Care       HISTORY OF PRESENT ILLNESS: Nuno Marsh is a 40year old [place]  female who presented for ESTABLISHING CARE    Having a work compensation going on  Needs medication refills  Other wise asymptomatic      PAST MEDICAL HISTORY:    Anxiety/depression: Controlled on no medication. Patient goes to behavioral therapy. Varicose  veinfs L>R. Patient has been seen by Dr. Johnna Ricks, vascular surgeon. Ultrasound was order and in the meantime patient is to try symptomatic treatment. Patient is symptomatic with bilateral leg pain. Symptoms have been present for many years. cardiovascular  Salpingo oophorectomy on hormone  Asthma well controlled on as needed inhalers  Non allergic  Rhinitis on nasal spray-well controlled  Obesity  GERD: On omeprazole  History of Prediabetes  History of Dyslipidemia-not on  statins  History of vitamin D deficiency, patient is not on supplementation    No chest pain,SOB,abdominal pain,changes in BM  Has positiv hearburn controlled on PPI    Review of systems a 12 point review of systems is completed. No additional pertinent positives or negatives  other than as mentioned in history of present illness          MEDICATIONS:  Current Outpatient Medications   Medication Sig Dispense Refill   â¢ Cholecalciferol (VITAMIN D3) 2000 units Tab      â¢ meloxicam (MOBIC) 15 MG tablet Take 1 tablet by mouth daily. 90 tablet 2   â¢ estradiol (ESTRACE) 2 MG tablet Take 1 tablet by mouth daily. 30 tablet 1   â¢ omeprazole (PRILOSEC) 20 MG capsule Take 1 capsule by mouth daily. 90 capsule 1   â¢ Garlic 532 MG Tab      â¢ albuterol 108 (90 Base) MCG/ACT inhaler Inhale 2 puffs into the lungs four times daily. 18 g 2   â¢ budesonide/formoterol (SYMBICORT) 80-4.5 MCG/ACT inhaler Inhale 2 puffs into the lungs 2 times daily. 10.2 g 12   â¢ sumatriptan (IMITREX) 50 MG tablet Take 50 mg by mouth as needed for Migraine. Take 1 tablet by mouth at onset of migraine.  May repeat after 2 hours if needed. â¢ cyanocobalamin (VITAMIN B-12) 500 MCG tablet Take 500 mcg by mouth daily. â¢ fluticasone (FLONASE) 50 MCG/ACT nasal spray Spray in each nostril as needed. No current facility-administered medications for this visit. ALLERGIES:  Allergies as of 09/09/2019   â¢ (No Known Allergies)     FAMILY HISTORY:  Family History   Problem Relation Age of Onset   â¢ High cholesterol Mother    â¢ Diabetes Mother    â¢ Hypertension Mother    â¢ Dementia/Alzheimers Mother    â¢ Heart Mother         pacemaker   â¢ Thyroid Mother         nodule   â¢ Osteoarthritis Mother    â¢ Systemic Lupus Erythematosus Mother    â¢ Cancer, Breast Maternal Grandmother    â¢ Cataracts Maternal Grandmother    â¢ Cancer Sister         ?stomach   â¢ Coronary Artery Disease Maternal Grandfather    â¢ Myocardial Infarction Maternal Grandfather    â¢ Hypertension Maternal Grandfather    â¢ Cancer, Liver Paternal Grandmother    â¢ Coronary Artery Disease Paternal Grandfather    â¢ Myocardial Infarction Paternal Grandfather    â¢ Hypertension Paternal Grandfather      SOCIAL HISTORY:  Social History     Tobacco Use   â¢ Smoking status: Never Smoker   â¢ Smokeless tobacco: Never Used   Substance Use Topics   â¢ Alcohol use: No   â¢ Drug use: No      with children,works with her     I have reviewed the patient's medications and allergies, past medical, surgical and family history, updating these as appropriate. See histories section of the EMR for display of this information. LABORATORIES  I have reviewed the pertinent jake russell tests.   These are the pertinent findings:  Lab Results   Component Value Date    HGBA1C 6.0 (H) 03/01/2019    HGBA1C 6.3 (H) 10/01/2018    HGBA1C 5.8 (H) 03/09/2018    CHOLESTEROL 218 (H) 03/01/2019    HDL 48 (L) 03/01/2019    CALCLDL 136 (H) 03/01/2019    TRIGLYCERIDE 168 (H) 03/01/2019    POTASSIUM 3.8 05/23/2019    CREATININE 0.66 05/23/2019    AST 15 05/23/2019    BILIRUBIN 0.3 05/23/2019    WBC 9.5 05/23/2019    HGB 11.6 (L) 05/23/2019     05/23/2019    VITD25 21.8 (L) 10/01/2018       PHYSICAL EXAMINATION:    Visit Vitals  Ht 5' (1.524 m)   Wt 99.3 kg   BMI 42.77 kg/mÂ²     Wt Readings from Last 3 Encounters:   09/09/19 99.3 kg   09/04/19 98.9 kg   08/21/19 98.9 kg     BP Readings from Last 4 Encounters:   09/04/19 122/76   08/21/19 120/76   08/06/19 120/80   07/10/19 124/80        Constitutional:  Well-developed, well-nourished, no acute distress. Obese  Eyes:  Pupils equal, round, reactive to light, conjunctivae normal. Normal eye movement. HENT:  Atraumatic, external ears and otoscopic exam normal, nose normal, oropharynx moist, no pharyngeal exudates. Neck- normal range of motion, no tenderness, supple. No tyromegaly. No JVD. No carotid bruits w/ symmetric upstrokes noted bilaterally. No thyroidmegaly. No lymphadenopathy. Respiratory:  Lungs clear to auscultation bilaterally. No wheezes, rhonchi noted. Equal excursion. Normal respiratory rate and effort. Cardiovascular:  Regular rate and rhythm. Nl S1 and S2, without murmur. No S3 or S4 noted. PMI non-displaced. No thrills or heaves noted. Peripheral pulses palpable and equal bilaterally. Gastrointestinal:  Soft, non-distended, normal bowel sounds  x 4 quadrants, nontender, no hepatomegaly or splenomegaly, no mass, no rebound, no guarding. Genitourinary:  No costovertebral angle tenderness. Musculoskeletal:  Gait steady. FROM of all extremities. No joint swelling. No edema, no tenderness, no deformities. Back- no tenderness. Integument:  Well-hydrated, no rash. Lymphatic:  No lymphadenopathy noted. Neurologic:  Alert and oriented times 3, cranial nerves 2-12 normal, normal motor function, normal sensory function, no focal deficits noted. Normal /symmetical reflexes. reflexes  Psychiatric:  Speech and behavior appropriate. ASSESSMENT:  1. Well controlled mild persistent asthma    2.  Hormone replacement therapy (postmenopausal)    3. Chronic non-seasonal allergic rhinitis    4. Gastroesophageal reflux disease without esophagitis    5. Morbid obesity with BMI of 40.0-44.9, adult (CMS/[facility])    6. IFG (impaired fasting glucose)    7. Mixed hyperlipidemia    8. Need for vaccination        1. History of asthma, well controlled. She rarely uses her Symbicort or albuterol. 2. Hormone replacement therapy. Patient has history of hysterectomy with salpingo-oophorectomy. She was started on hormonal replacement. 3. Rhinitis-nonseasonal allergic rhinitis. Patient has a negative sulcus spray nasal spray. Well controlled. 4. Reflux. Controlled on medication  5. Obesity. This subject was not discussed today. We will talk about that next appointment. History of migraines. She hasn't had a migraine for many years. She has a prescription for Imitrex just in case. 6. Prediabetes, patient advised on exercise and diet. 7. Dyslipidemia, she is not on statins. PLAN:  Orders Placed This Encounter   â¢ INFLUENZA QUADRIVALENT SPLIT 0.5 ML VACC, IM   â¢ Cholecalciferol (VITAMIN D3) 2000 units Tab   â¢ meloxicam (MOBIC) 15 MG tablet       Return in about 2 weeks (around 9/23/2019), or f/u labs -20 min. All the above was discussed with the patient in detail; questions were answered to the patient's satisfaction. Patient left the office in stable condition with verbal and written instructions. no

## 2024-02-08 NOTE — CONSULT NOTE ADULT - ASSESSMENT
38 year old female, 35 weeks pregnant, with thrombosed hemorrhoid.    Plan:  - No acute colorectal surgery interventions at this time  - Recommend conservative management with the following:  * High fiber diet  * Staying hydrated  * Sitz baths four times per day  * Bowel regimen  * Witch hazel  * Lidocaine cream  - Will follow    Discussed with colorectal surgery fellow, Dr. Fajardo, on behalf of colorectal surgery attending, Dr. Whitfield.      A Team Surgery  [id number] 38 year old female, 35 weeks pregnant, with thrombosed hemorrhoid.    Plan:  - No acute colorectal surgery interventions at this time  - Recommend conservative management with the following:  * High fiber diet  * Staying hydrated  * Sitz baths four times per day  * Bowel regimen  * Witch hazel  * Lidocaine cream  - Will follow    Discussed with colorectal surgery fellow, Dr. Fajardo, on behalf of colorectal surgery attending on call, Dr. Whitfield.      A Team Surgery  [id number]

## 2024-02-08 NOTE — OB PROVIDER TRIAGE NOTE - NSICDXPASTMEDICALHX_GEN_ALL_CORE_FT
PAST MEDICAL HISTORY:  Anxiety     Asthma childhood-no meds    H/O LEEP     Migraines     Other iron deficiency anemia     Spontaneous  no d&c

## 2024-02-08 NOTE — CONSULT NOTE ADULT - SUBJECTIVE AND OBJECTIVE BOX
General Surgery Consult  Consulting surgical team: Colorectal Surgery / A Team  Consulting attending: Dr. Whitfield    HPI:  Patient is a 38 year old female, 35 weeks pregnant, presenting with hemorrhoid pain. Has had increased pain for the past 2 days. Has had history of hemorrhoids with previous pregnancy. Complains of constipation. Takes iron and stool softeners. Suppository helped with previous pregnancy. Has never had any colorectal procedures in the past.      PAST MEDICAL HISTORY:  Asthma    Other iron deficiency anemia    Spontaneous     Migraines    Anxiety    H/O LEEP        PAST SURGICAL HISTORY:  Bilateral recurrent inguinal hernia without obstruction or gangrene    History of tonsillectomy    History of sinus surgery    History of third molar tooth extraction, unspecified edentulism class    H/O LEEP    H/O unilateral salpingectomy        MEDICATIONS:  albuterol    90 MICROgram(s) HFA Inhaler 2 Puff(s) Inhalation every 6 hours PRN  influenza   Vaccine 0.5 milliLiter(s) IntraMuscular once  lactated ringers. 1000 milliLiter(s) IV Continuous <Continuous>  sertraline 100 milliGRAM(s) Oral daily      ALLERGIES:  No Known Allergies      VITALS & I/Os:  Vital Signs Last 24 Hrs  T(C): 36.8 (2024 20:30), Max: 36.8 (2024 18:36)  T(F): 98.24 (2024 20:30), Max: 98.24 (2024 20:30)  HR: 68 (2024 21:07) (68 - 101)  BP: 118/75 (2024 21:07) (107/57 - 141/87)  BP(mean): --  RR: 17 (2024 20:30) (15 - 17)  SpO2: --    Parameters below as of 2024 18:36  Patient On (Oxygen Delivery Method): room air        I&O's Summary      PHYSICAL EXAM:  Gen: NAD  CV: Regular rate  Resp: Nonlabored breathing on room air  Rectal: (Chaperoned by RN) 3cm thrombosed external hemorrhoid, tender to palpation      LABS:                        10.7   11.20 )-----------( 181      ( 2024 14:47 )             30.6     02-08    139  |  106  |  8   ----------------------------<  128<H>  3.4<L>   |  22  |  0.60    Ca    8.3<L>      2024 14:47    TPro  5.9<L>  /  Alb  2.8<L>  /  TBili  0.2  /  DBili  x   /  AST  14  /  ALT  10  /  AlkPhos  128<H>  02-08    Lactate:    PT/INR - ( 2024 14:47 )   PT: 10.8 sec;   INR: 0.96 ratio         PTT - ( 2024 14:47 )  PTT:28.7 sec          Urinalysis Basic - ( 2024 14:47 )    Color: Yellow / Appearance: Clear / S.018 / pH: x  Gluc: 128 mg/dL / Ketone: Trace mg/dL  / Bili: Negative / Urobili: 0.2 mg/dL   Blood: x / Protein: Negative mg/dL / Nitrite: Negative   Leuk Esterase: Moderate / RBC: 0 /HPF / WBC 4 /HPF   Sq Epi: x / Non Sq Epi: 6 /HPF / Bacteria: Occasional /HPF        IMAGING:  
Neurology - Consult Note    -  Spectra: [id number] ([facility]), [phone] ([facility])  -    HPI: Patient MITCHELL EDWARD is a 38y (1985) RH woman with a PMHx significant for migraines, currently 35 weeks pregnant who presented to the ED for painful hemorrhoids and hypertension. Neurology consulted d/t new onset tingling/numbness. Patient stated that 2-3 days ago she began having tingling in the b/l LE followed by tingling in the LUE 1-2 days ago that has persisted. Additionally, patient has a minor dull headache which has been managed by tylenol and is not typical of her migraines.  As per primary team patient also had vision "spots" that were transient. She denied any prior similar episodes, weakness, changes in speech or coordination, bowel or bladder incontinence, perineal numbness, vomiting, photosensitivity, prior stroke. Her headache does not worsen with position.  Additionally, patient c/o burning while urinating. While in the hospital noted to be mildly hypertensive with sbp 140s. UA abnormal but neg for protein.      Her prior migraines are managed with Botox under Dr. Sean Dillard.    She stated that her prior pregnancy was normal and has a healthy 4 year old child.       Review of Systems:     CONSTITUTIONAL: No fevers or chills  EYES AND ENT: No visual changes or no throat pain   NECK: No pain or stiffness  RESPIRATORY: No hemoptysis or shortness of breath  CARDIOVASCULAR: No chest pain or palpitations  GASTROINTESTINAL: No melena or hematochezia  GENITOURINARY: No dysuria or hematuria  NEUROLOGICAL: +As stated in HPI above  SKIN: No itching, burning, rashes, or lesions   All other review of systems is negative unless indicated above.    Allergies:  No Known Allergies      PMHx/PSHx/Family Hx: As above, otherwise see below   Asthma    Other iron deficiency anemia    Spontaneous     Migraines    Anxiety    H/O LEEP        Social Hx:  No reported use of tobacco, alcohol, or illicit drugs    Medications:  MEDICATIONS  (STANDING):    MEDICATIONS  (PRN):        Home Medications:  albuterol 90 mcg/inh inhalation aerosol: 2 puff(s) inhaled every 6 hours, As needed, Shortness of Breath and/or Wheezing (2024 14:03)  aspirin 81 mg oral tablet: orally (2024 15:55)  iron:  (2024 15:55)  prenatal vitamins:  (2024 15:55)  Zoloft 100 mg oral tablet: 1 tab(s) orally (2024 15:55)      Vitals:  T(C): 36.7 (24 @ 13:56), Max: 36.7 (24 @ 13:56)  HR: 75 (24 @ 17:05) (75 - 101)  BP: 135/89 (24 @ 17:05) (126/80 - 141/87)  RR: 16 (24 @ 13:56) (16 - 16)  SpO2: --    Physical Examination:   General - NAD  Cardiovascular - Peripheral pulses palpable, no edema    Neurologic Exam:  Mental status - Awake, Alert, Oriented to person, place, and time. Speech fluent, repetition and naming intact. Follows simple and complex commands. attention, concentration and fund of knowledge intact.     Cranial nerves:  CN II: Visual fields are full to confrontation. Pupils are 3 mm and briskly reactive to light.   CN III, IV, VI: EOMI, no nystagmus, no ptosis  CN V: Facial sensation is intact to pinprick in all 3 divisions bilaterally.  CN VII: Face is symmetric with normal eye closure and smile.  CN VII: Hearing is normal to rubbing fingers  CN IX, X: Palate elevates symmetrically. Phonation is normal.  CN XI: Head turning and shoulder shrug are intact  CN XII: Tongue is midline with normal movements and no atrophy.    Motor - Normal bulk and tone throughout. No pronator drift.  Strength testing            Deltoid      Biceps      Triceps     Wrist Extension    Wrist Flexion       R            5                 5               5                     5                              5                        5  L             5                 5               5                     5                              5                       5              Hip Flexion    Hip Extension    Knee Flexion    Knee Extension    Dorsiflexion    Plantar Flexion  R              5                           5                       5                           5                            5                          5  L              5                           5                        5                           5                            5                          5    neg hoffmans b/l    Sensation - Light touch/pin prick/ vibration/ proprioception/ temperature intact throughout    DTR's -             Biceps      Triceps     Brachioradialis      Patellar    Ankle    Toes/plantar response  R             2+             2+                  2+                       2+            2+                 Down  L              2+             2+                 2+                        2+           2+                 Down    Coordination - Finger to Nose and heal to shin intact b/l. No tremors appreciated    Gait and station - Not assessed    Labs:                        10.7   11.20 )-----------( 181      ( 2024 14:47 )             30.6         139  |  106  |  8   ----------------------------<  128<H>  3.4<L>   |  22  |  0.60    Ca    8.3<L>      2024 14:47    TPro  5.9<L>  /  Alb  2.8<L>  /  TBili  0.2  /  DBili  x   /  AST  14  /  ALT  10  /  AlkPhos  128<H>      CAPILLARY BLOOD GLUCOSE        LIVER FUNCTIONS - ( 2024 14:47 )  Alb: 2.8 g/dL / Pro: 5.9 g/dL / ALK PHOS: 128 U/L / ALT: 10 U/L / AST: 14 U/L / GGT: x             PT/INR - ( 2024 14:47 )   PT: 10.8 sec;   INR: 0.96 ratio         PTT - ( 2024 14:47 )  PTT:28.7 sec  CSF:                  Radiology:

## 2024-02-08 NOTE — OB PROVIDER TRIAGE NOTE - NSICDXPASTSURGICALHX_GEN_ALL_CORE_FT
PAST SURGICAL HISTORY:  Bilateral recurrent inguinal hernia without obstruction or gangrene as infant    H/O LEEP     H/O unilateral salpingectomy     History of sinus surgery 5yrs ago    History of third molar tooth extraction, unspecified edentulism class 2011    History of tonsillectomy age 9

## 2024-02-08 NOTE — CONSULT NOTE ADULT - TIME BILLING
I interviewed the patient, discussed the case with the Resident and agree with the findings and plan as documented in the Resident's note except below.  Currently patient is back to baseline and denies for headache and paresthesia. Non focal exam. No further neurology work up needed at this time.     Continue medical management, neuro- check and fall precaution.  GI and DVT prophylaxis.  I discussed the diagnosis and treatment plan with the patient.  All questions and concerns were addressed. The patient demonstrated good understanding of the treatment plan.  My cumulative time taking care of this patient is 80 minutes  If you have any further questions, please do not hesitate to contact our consult service.  Thank you for allowing us to participate in this patient care. I interviewed the patient, discussed the case with the Resident and agree with the findings and plan as documented in the Resident's note except below.  Currently patient is back to baseline and denies for headache and paresthesia. Non focal exam. No further neurology work up needed at this time.     Continue medical management, neuro- check and fall precaution.  GI and DVT prophylaxis.  I discussed the diagnosis and treatment plan with the patient.  All questions and concerns were addressed. The patient demonstrated good understanding of the treatment plan.  If you have any further questions, please do not hesitate to contact our consult service.  Thank you for allowing us to participate in this patient care.

## 2024-02-08 NOTE — OB PROVIDER H&P - ALERT: PERTINENT HISTORY
Fetal Sonogram/20 Week Level II Sonogram/BioPhysical Profile(s)/Follow up Sonogram for Growth/Non Invasive Prenatal Screen (NIPS)

## 2024-02-08 NOTE — OB PROVIDER H&P - HISTORY OF PRESENT ILLNESS
37yo female  FELICIANO 3/10/24 presents @35.4 weeks with complaints of hemorrhoid pain, vision changes this morning and last night, headache, b/l LE numbness, Left arm numbness, and b/l LE swelling. States she saw black spots last night and this morning. She took tylenol @3am for her headache which slightly helped. She placed H-prep last night and had a BM. Admits burning while she urinates. Denies shortness of breath, fever, chills, RUQ pain, epigastric pain or cough.  Denies vaginal bleeding, leakage of fluids, or contractions today. Reports positive fetal movement.    Antepartum Course: IVF pregnancy, anatomy scan wnl, fetal echo wnl, GCT abnormal GTT wnl, GBS unknown, NIPT low risk, resolved low lying placenta, Anxiety on zoloft 100mg daily, ASA daily,   Prenatal labs:  -T&S: A+  -Rubella: Immune  -Hep B: Nonreactive  -HIV: Nonreactive  -RPR: Negative  Ultrasounds: Last [facility] sonogram EFW 4#11 37yo female  FELICIANO 3/10/24 presents @35.4 weeks with complaints of hemorrhoid pain, vision changes this morning and last night, headache, b/l LE numbness, Left arm numbness, and b/l LE swelling. States she saw black spots last night and this morning. She took tylenol @3am for her headache which slightly helped. She placed H-prep last night and had a BM. Admits burning while she urinates. Denies shortness of breath, fever, chills, RUQ pain, epigastric pain or cough.  Denies vaginal bleeding, leakage of fluids, or contractions today. Reports positive fetal movement.    Antepartum Course: IVF pregnancy, anatomy scan wnl, fetal echo wnl, GCT abnormal GTT wnl, GBS unknown, NIPT low risk, resolved low lying placenta, Anxiety on zoloft 100mg daily, ASA daily,   Prenatal labs:  -T&S: A+  -Rubella: Immune  -Hep B: Nonreactive  -HIV: Nonreactive  -RPR: Negative  Ultrasounds: Last [facility] sonogram EFW 4#11

## 2024-02-08 NOTE — OB RN TRIAGE NOTE - CHIEF COMPLAINT QUOTE
r/o PEC sent from office r/o PEC sent by MD c/o headache, blurry vision yesterday, swelling r/o PEC sent by MD c/o headache 3/10, blurry vision yesterday, swelling

## 2024-02-08 NOTE — OB PROVIDER TRIAGE NOTE - HISTORY OF PRESENT ILLNESS
39yo female  FELICIANO 3/10/24 presents @35.4 weeks with complaints of hemorrhoid pain, vision changes this morning and last night, headache, b/l LE numbness, Left arm numbness, and b/l LE swelling. States she saw black spots last night and this morning. She took tylenol @3am for her headache which slightly helped. She placed H-prep last night and had a BM. Admits burning while she urinates. Denies shortness of breath, fever, chills, RUQ pain, epigastric pain or cough.  Denies vaginal bleeding, leakage of fluids, or contractions today. Reports positive fetal movement.    Antepartum Course: IVF pregnancy, anatomy scan wnl, fetal echo wnl, GCT abnormal GTT wnl, GBS unknown, NIPT low risk, resolved low lying placenta, Anxiety on zoloft 100mg daily, ASA daily,   Prenatal labs:  -T&S: A+  -Rubella: Immune  -Hep B: Nonreactive  -HIV: Nonreactive  -RPR: Negative  Ultrasounds: Last [facility] sonogram EFW 4#11 39yo female  FELICIANO 3/10/24 presents @35.4 weeks with complaints of hemorrhoid pain, vision changes this morning and last night, headache, b/l LE numbness, Left arm numbness, and b/l LE swelling. States she saw black spots last night and this morning. She took tylenol @3am for her headache which slightly helped. She placed H-prep last night and had a BM. Admits burning while she urinates. Denies shortness of breath, fever, chills, RUQ pain, epigastric pain or cough.  Denies vaginal bleeding, leakage of fluids, or contractions today. Reports positive fetal movement.    Antepartum Course: IVF pregnancy, anatomy scan wnl, fetal echo wnl, GCT abnormal GTT wnl, GBS unknown, NIPT low risk, resolved low lying placenta, Anxiety on zoloft 100mg daily, ASA daily,   Prenatal labs:  -T&S: A+  -Rubella: Immune  -Hep B: Nonreactive  -HIV: Nonreactive  -RPR: Negative  Ultrasounds: Last [facility] sonogram EFW 4#11

## 2024-02-08 NOTE — OB PROVIDER H&P - NSHPSOCIALHISTORY_GEN_ALL_CORE
Denies alcohol/tobacco/drug use during pregnancy. Denies history of Gonorrhea, Chlamydia, HIV, STIs.    Psych: Admits PPD, anxiety, denies suicidal thoughts, suicidal ideation, anger at the baby

## 2024-02-08 NOTE — OB PROVIDER TRIAGE NOTE - NSHPPHYSICALEXAM_GEN_ALL_CORE
Vitals: ICU Vital Signs Last 24 Hrs  T(C): 36.7 (08 Feb 2024 13:56), Max: 36.7 (08 Feb 2024 13:56)  T(F): 98.1 (08 Feb 2024 13:56), Max: 98.1 (08 Feb 2024 13:56)  HR: 78 (08 Feb 2024 16:51) (78 - 101)  BP: 138/87 (08 Feb 2024 16:51) (126/80 - 141/87)  BP(mean): --  ABP: --  ABP(mean): --  RR: 16 (08 Feb 2024 13:56) (16 - 16)  SpO2: --      General: A&O x3  Neuro: symmetrical facial features, no slurring of words  Lungs: breathing is unlabored  Extremities: full range of motion x4  Abdomen: Soft, Gravid, TOCO in place, negative CVA tenderness    EFM: baseline , moderate variability, +accels, -decels, Category 1, reactive  TOCO: uterine irritability     : thrombosed 4cm hemorrhoid    Limited Bedside Ultrasound: cephalic presentation, posterior placenta, M-mode 148, NAEL 11.86, BPP 8/8, Fetal HR movement seen  Images saved on ASOB    Extremities: FROM, bilateral 1+ LE edema  Neuro: grossly intact
patient

## 2024-02-08 NOTE — OB PROVIDER H&P - NSLOWPPHRISK_OBGYN_A_OB
No previous uterine incision/[place] Pregnancy/Less than or equal to 4 previous vaginal births/No known bleeding disorder/No history of postpartum hemorrhage

## 2024-02-08 NOTE — OB PROVIDER H&P - ASSESSMENT
37yo  @ 35.4w presenting with Labile BP, multiple neurological complaints, hemorrhoid pain    - Admit to L&D  - NST bid  - IV access  - HELLP labs wnl  - Urine culture   - PC RAtio 0.2  - Blood transfusion consent  - Admission consent   - 24 hr urine  - Neuro consult  - General surgery consult, NPO until seen  - Discussed with Dr. Oseas Acuna, PAC 39yo  @ 35.4w presenting with Labile BP, multiple neurological complaints, hemorrhoid pain    - Admit to L&D  - NST bid  - IV access  - HELLP labs wnl  - Urine culture   - PC RAtio 0.2  - Blood transfusion consent  - Admission consent   - 24 hr urine  - Neuro consult  - General surgery consult, NPO until seen  - Discussed with Dr. Oseas Acuna, PAC

## 2024-02-08 NOTE — OB RN TRIAGE NOTE - NSICDXPASTMEDICALHX_GEN_ALL_CORE_FT
PAST MEDICAL HISTORY:  Asthma childhood-no meds    Other iron deficiency anemia     Spontaneous  no d&c     PAST MEDICAL HISTORY:  Anxiety     Asthma childhood-no meds    Migraines     Other iron deficiency anemia     Spontaneous  no d&c     PAST MEDICAL HISTORY:  Anxiety     Asthma childhood-no meds    H/O LEEP     Migraines     Other iron deficiency anemia     Spontaneous  no d&c

## 2024-02-08 NOTE — OB PROVIDER TRIAGE NOTE - NSOBPROVIDERNOTE_OBGYN_ALL_OB_FT
39yo  @ 35.4w presenting with Labile BP, multiple neurological complaints, hemorrhoid pain    - Admit to L&D  - NST bid  - IV access  - HELLP labs wnl  - Urine culture   - PC RAtio 0.2  - Blood transfusion consent  - Admission consent   - 24 hr urine  - Neuro consult  - General surgery consult, NPO until seen  - Discussed with Dr. Oseas Acuna, PAC 37yo  @ 35.4w presenting with Labile BP, multiple neurological complaints, hemorrhoid pain    - Admit to L&D  - NST bid  - IV access  - HELLP labs wnl  - Urine culture   - PC RAtio 0.2  - Blood transfusion consent  - Admission consent   - 24 hr urine  - Neuro consult  - General surgery consult, NPO until seen  - Discussed with Dr. Oseas Acuna, PAC

## 2024-02-08 NOTE — OB PROVIDER TRIAGE NOTE - NS_OBGYNHISTORY_OBGYN_ALL_OB_FT
2019, sab, complete  2020, , 36w, PPROM, 3251g  , ectopic pregnancy s/p R salpingectomy    Gyn Hx: Denies fibroids, ovarian cysts, abnormal pap smears

## 2024-02-08 NOTE — OB PROVIDER H&P - NSHPPHYSICALEXAM_GEN_ALL_CORE
Vitals: ICU Vital Signs Last 24 Hrs  T(C): 36.7 (08 Feb 2024 13:56), Max: 36.7 (08 Feb 2024 13:56)  T(F): 98.1 (08 Feb 2024 13:56), Max: 98.1 (08 Feb 2024 13:56)  HR: 78 (08 Feb 2024 16:51) (78 - 101)  BP: 138/87 (08 Feb 2024 16:51) (126/80 - 141/87)  BP(mean): --  ABP: --  ABP(mean): --  RR: 16 (08 Feb 2024 13:56) (16 - 16)  SpO2: --      General: A&O x3  Neuro: symmetrical facial features, no slurring of words  Lungs: breathing is unlabored  Extremities: full range of motion x4  Abdomen: Soft, Gravid, TOCO in place, negative CVA tenderness    EFM: baseline , moderate variability, +accels, -decels, Category 1, reactive  TOCO: uterine irritability     : thrombosed 4cm hemorrhoid    Limited Bedside Ultrasound: cephalic presentation, posterior placenta, M-mode 148, NAEL 11.86, BPP 8/8, Fetal HR movement seen  Images saved on ASOB    Extremities: FROM, bilateral 1+ LE edema  Neuro: grossly intact

## 2024-02-09 ENCOUNTER — APPOINTMENT (OUTPATIENT)
Dept: ANTEPARTUM | Facility: CLINIC | Age: 39
End: 2024-02-09
Payer: COMMERCIAL

## 2024-02-09 ENCOUNTER — ASOB RESULT (OUTPATIENT)
Age: 39
End: 2024-02-09

## 2024-02-09 ENCOUNTER — TRANSCRIPTION ENCOUNTER (OUTPATIENT)
Age: 39
End: 2024-02-09

## 2024-02-09 ENCOUNTER — APPOINTMENT (OUTPATIENT)
Dept: OBGYN | Facility: CLINIC | Age: 39
End: 2024-02-09

## 2024-02-09 VITALS — HEART RATE: 82 BPM | OXYGEN SATURATION: 96 %

## 2024-02-09 PROBLEM — F41.9 ANXIETY DISORDER, UNSPECIFIED: Chronic | Status: ACTIVE | Noted: 2024-02-08

## 2024-02-09 PROBLEM — G43.909 MIGRAINE, UNSPECIFIED, NOT INTRACTABLE, WITHOUT STATUS MIGRAINOSUS: Chronic | Status: ACTIVE | Noted: 2024-02-08

## 2024-02-09 LAB
ALBUMIN SERPL ELPH-MCNC: 2.7 G/DL — LOW (ref 3.3–5)
ALP SERPL-CCNC: 122 U/L — HIGH (ref 40–120)
ALT FLD-CCNC: 7 U/L — SIGNIFICANT CHANGE UP (ref 4–33)
ANION GAP SERPL CALC-SCNC: 9 MMOL/L — SIGNIFICANT CHANGE UP (ref 7–14)
APTT BLD: 27.4 SEC — SIGNIFICANT CHANGE UP (ref 24.5–35.6)
AST SERPL-CCNC: 13 U/L — SIGNIFICANT CHANGE UP (ref 4–32)
B BURGDOR C6 AB SER-ACNC: NEGATIVE — SIGNIFICANT CHANGE UP
B BURGDOR IGG+IGM SER-ACNC: 0.07 INDEX — SIGNIFICANT CHANGE UP (ref 0.01–0.89)
BASOPHILS # BLD AUTO: 0.03 K/UL — SIGNIFICANT CHANGE UP (ref 0–0.2)
BASOPHILS NFR BLD AUTO: 0.3 % — SIGNIFICANT CHANGE UP (ref 0–2)
BILIRUB SERPL-MCNC: 0.2 MG/DL — SIGNIFICANT CHANGE UP (ref 0.2–1.2)
BUN SERPL-MCNC: 8 MG/DL — SIGNIFICANT CHANGE UP (ref 7–23)
CALCIUM SERPL-MCNC: 8.4 MG/DL — SIGNIFICANT CHANGE UP (ref 8.4–10.5)
CHLORIDE SERPL-SCNC: 107 MMOL/L — SIGNIFICANT CHANGE UP (ref 98–107)
CO2 SERPL-SCNC: 23 MMOL/L — SIGNIFICANT CHANGE UP (ref 22–31)
COLLECT DURATION TIME UR: 24 HR — SIGNIFICANT CHANGE UP
CREAT SERPL-MCNC: 0.73 MG/DL — SIGNIFICANT CHANGE UP (ref 0.5–1.3)
EGFR: 109 ML/MIN/1.73M2 — SIGNIFICANT CHANGE UP
EOSINOPHIL # BLD AUTO: 0.04 K/UL — SIGNIFICANT CHANGE UP (ref 0–0.5)
EOSINOPHIL NFR BLD AUTO: 0.5 % — SIGNIFICANT CHANGE UP (ref 0–6)
FIBRINOGEN PPP-MCNC: 338 MG/DL — SIGNIFICANT CHANGE UP (ref 200–465)
FOLATE SERPL-MCNC: 11.2 NG/ML — SIGNIFICANT CHANGE UP (ref 3.1–17.5)
GLUCOSE SERPL-MCNC: 118 MG/DL — HIGH (ref 70–99)
HCT VFR BLD CALC: 29.8 % — LOW (ref 34.5–45)
HGB BLD-MCNC: 10.2 G/DL — LOW (ref 11.5–15.5)
IANC: 6.46 K/UL — SIGNIFICANT CHANGE UP (ref 1.8–7.4)
IMM GRANULOCYTES NFR BLD AUTO: 0.7 % — SIGNIFICANT CHANGE UP (ref 0–0.9)
INR BLD: 0.97 RATIO — SIGNIFICANT CHANGE UP (ref 0.85–1.18)
LDH SERPL L TO P-CCNC: 172 U/L — SIGNIFICANT CHANGE UP (ref 135–225)
LYMPHOCYTES # BLD AUTO: 1.58 K/UL — SIGNIFICANT CHANGE UP (ref 1–3.3)
LYMPHOCYTES # BLD AUTO: 18.4 % — SIGNIFICANT CHANGE UP (ref 13–44)
MCHC RBC-ENTMCNC: 31.8 PG — SIGNIFICANT CHANGE UP (ref 27–34)
MCHC RBC-ENTMCNC: 34.2 GM/DL — SIGNIFICANT CHANGE UP (ref 32–36)
MCV RBC AUTO: 92.8 FL — SIGNIFICANT CHANGE UP (ref 80–100)
MONOCYTES # BLD AUTO: 0.42 K/UL — SIGNIFICANT CHANGE UP (ref 0–0.9)
MONOCYTES NFR BLD AUTO: 4.9 % — SIGNIFICANT CHANGE UP (ref 2–14)
NEUTROPHILS # BLD AUTO: 6.46 K/UL — SIGNIFICANT CHANGE UP (ref 1.8–7.4)
NEUTROPHILS NFR BLD AUTO: 75.2 % — SIGNIFICANT CHANGE UP (ref 43–77)
NRBC # BLD: 0 /100 WBCS — SIGNIFICANT CHANGE UP (ref 0–0)
NRBC # FLD: 0 K/UL — SIGNIFICANT CHANGE UP (ref 0–0)
PLATELET # BLD AUTO: 175 K/UL — SIGNIFICANT CHANGE UP (ref 150–400)
POTASSIUM SERPL-MCNC: 3.5 MMOL/L — SIGNIFICANT CHANGE UP (ref 3.5–5.3)
POTASSIUM SERPL-SCNC: 3.5 MMOL/L — SIGNIFICANT CHANGE UP (ref 3.5–5.3)
PROT 24H UR-MRATE: 232 MG/24 H — HIGH
PROT SERPL-MCNC: 5.5 G/DL — LOW (ref 6–8.3)
PROTHROM AB SERPL-ACNC: 11 SEC — SIGNIFICANT CHANGE UP (ref 9.5–13)
RBC # BLD: 3.21 M/UL — LOW (ref 3.8–5.2)
RBC # FLD: 12.9 % — SIGNIFICANT CHANGE UP (ref 10.3–14.5)
SODIUM SERPL-SCNC: 139 MMOL/L — SIGNIFICANT CHANGE UP (ref 135–145)
T PALLIDUM AB TITR SER: NEGATIVE — SIGNIFICANT CHANGE UP
TOTAL VOLUME - 24 HOUR: 1025 ML — SIGNIFICANT CHANGE UP
TSH SERPL-MCNC: 1.93 UIU/ML — SIGNIFICANT CHANGE UP (ref 0.27–4.2)
URATE SERPL-MCNC: 4.9 MG/DL — SIGNIFICANT CHANGE UP (ref 2.5–7)
URINE CREATININE CALCULATION: 1.3 G/24 H — SIGNIFICANT CHANGE UP (ref 0.6–1.6)
VIT B12 SERPL-MCNC: 388 PG/ML — SIGNIFICANT CHANGE UP (ref 200–900)
WBC # BLD: 8.59 K/UL — SIGNIFICANT CHANGE UP (ref 3.8–10.5)
WBC # FLD AUTO: 8.59 K/UL — SIGNIFICANT CHANGE UP (ref 3.8–10.5)

## 2024-02-09 PROCEDURE — 76816 OB US FOLLOW-UP PER FETUS: CPT | Mod: 26

## 2024-02-09 PROCEDURE — 99222 1ST HOSP IP/OBS MODERATE 55: CPT | Mod: GC

## 2024-02-09 PROCEDURE — 76819 FETAL BIOPHYS PROFIL W/O NST: CPT | Mod: 26,59

## 2024-02-09 RX ORDER — SERTRALINE 25 MG/1
100 TABLET, FILM COATED ORAL DAILY
Refills: 0 | Status: DISCONTINUED | OUTPATIENT
Start: 2024-02-09 | End: 2024-02-09

## 2024-02-09 RX ORDER — CHLORHEXIDINE GLUCONATE 213 G/1000ML
1 SOLUTION TOPICAL
Refills: 0 | Status: DISCONTINUED | OUTPATIENT
Start: 2024-02-09 | End: 2024-02-09

## 2024-02-09 RX ORDER — DIPHENHYDRAMINE HCL 50 MG
25 CAPSULE ORAL EVERY 4 HOURS
Refills: 0 | Status: DISCONTINUED | OUTPATIENT
Start: 2024-02-09 | End: 2024-02-09

## 2024-02-09 RX ORDER — METOCLOPRAMIDE HCL 10 MG
10 TABLET ORAL ONCE
Refills: 0 | Status: COMPLETED | OUTPATIENT
Start: 2024-02-09 | End: 2024-02-09

## 2024-02-09 RX ADMIN — Medication 975 MILLIGRAM(S): at 08:29

## 2024-02-09 RX ADMIN — Medication 1 APPLICATION(S): at 12:19

## 2024-02-09 RX ADMIN — Medication 25 MILLIGRAM(S): at 10:09

## 2024-02-09 RX ADMIN — SERTRALINE 100 MILLIGRAM(S): 25 TABLET, FILM COATED ORAL at 12:29

## 2024-02-09 RX ADMIN — Medication 1 APPLICATION(S): at 06:29

## 2024-02-09 RX ADMIN — AER TRAVELER 1 APPLICATION(S): 0.5 SOLUTION RECTAL; TOPICAL at 12:19

## 2024-02-09 RX ADMIN — Medication 975 MILLIGRAM(S): at 00:04

## 2024-02-09 RX ADMIN — Medication 10 MILLIGRAM(S): at 10:10

## 2024-02-09 RX ADMIN — AER TRAVELER 1 APPLICATION(S): 0.5 SOLUTION RECTAL; TOPICAL at 06:29

## 2024-02-09 NOTE — DISCHARGE NOTE ANTEPARTUM - HOSPITAL COURSE
39yo  admitted at 35w4d w/ c/o hemorrhoid pain, vision changes, headache, b/l LE numbness, L arm numbness, and b/l LE swelling. HELLP labs sent adn WNL. P/C 0.2. 24 hour urine collected. Neurology consulted who recommended peripheral neuropathy workup. No further workup recommended and patient cleared by neurology. Colorectal surgery consulted for hemorrhoid pain and recommended conservative management - high fiber diet, hydration, sitz bad QID, witch hazel and lidocaine cream. MFM consulted and no c/f preeclampsia at this time.  37yo  admitted at 35w4d w/ c/o hemorrhoid pain, vision changes, headache, b/l LE numbness, L arm numbness, and b/l LE swelling. HELLP labs sent and WNL. P/C 0.2. 24 hour urine collected and sent. Neurology consulted who recommended peripheral neuropathy workup. Workup was WNL. No further workup recommended and patient cleared by neurology. Colorectal surgery consulted for hemorrhoid pain and recommended conservative management - high fiber diet, hydration, sitz bad QID, witch hazel and lidocaine cream. MFM consulted and no c/f preeclampsia at this time. Blood pressures have remained WNL.     Patient cleared for d/c by Dr. Richardson. Will f/up 24 hour urine.

## 2024-02-09 NOTE — DISCHARGE NOTE ANTEPARTUM - CARE PROVIDERS DIRECT ADDRESSES
[email or web address]

## 2024-02-09 NOTE — DISCHARGE NOTE ANTEPARTUM - USE THE FOOD PYRAMID (LOCATED IN DISCHARGE MATERIALS PROVIDED) AS A GUIDE TO BALANCE AND MODERATION
From: Christina Still  Sent: 2/19/2018 5:00 PM EST  Subject: Medication Renewal Request    Christina Still would like a refill of the following medications:  Blood Glucose Monitoring Suppl (FREESTYLE LITE) ORION Sharin Sheerer Nonie Phalen, MD]  VICTOZA 25 MG/3ML SOPN SC injection Sharin Sheerer Nonie Phalen, MD]    Preferred pharmacy: [id number] Christine [address], [address] [phone] - F [phone]    Comment: Statement Selected

## 2024-02-09 NOTE — DISCHARGE NOTE ANTEPARTUM - PATIENT PORTAL LINK FT
You can access the FollowMyHealth Patient Portal offered by [facility] by registering at the following website: [email or web address]. By joining [facility]’s FollowMyHealth portal, you will also be able to view your health information using other applications (apps) compatible with our system.

## 2024-02-09 NOTE — PROGRESS NOTE ADULT - SUBJECTIVE AND OBJECTIVE BOX
R3 Antepartum Note,    Patient seen and examined at bedside, no acute overnight events. States that headache remains unresolved but is mild. Pt has a hx of migraines which she receives botox injections for outside of pregnancy but is not taking any medications. Also reports that her vision has been on and off blurry for the last several days but denies scotoma. Patient otherwise denies RUQ/epigastric pain, CP, SOB, N/V, fevers, and chills. Pt reports +FM, denies LOF, VB, ctx.    Vital Signs Last 24 Hours  T(C): 36.6 (02-09-24 @ 05:31), Max: 36.8 (02-08-24 @ 18:36)  HR: 67 (02-09-24 @ 05:31) (67 - 101)  BP: 104/58 (02-09-24 @ 05:31) (101/58 - 141/87)  RR: 16 (02-09-24 @ 05:31) (15 - 18)  SpO2: 97% (02-09-24 @ 05:31) (97% - 97%)    CAPILLARY BLOOD GLUCOSE          Physical Exam:  General: NAD  Abdomen: Soft, non-tender, gravid  Ext: No pain or swelling    Labs:             10.7   11.20 )-----------( 181      ( 02-08 @ 14:47 )             30.6     02-08 @ 14:47    139  |  106  |  8   ----------------------------<  128  3.4   |  22  |  0.60    Ca    8.3      02-08 @ 14:47    TPro  5.9  /  Alb  2.8  /  TBili  0.2  /  DBili  x   /  AST  14  /  ALT  10  /  AlkPhos  128  02-08 @ 14:47    PT/INR - ( 02-08 @ 14:47 )   PT: 10.8 sec;   INR: 0.96 ratio    PTT - ( 02-08 @ 14:47 )  PTT:28.7 sec    Uric Acid: (02-08 @ 14:47)  4.5      Fibrinogen: (02-08 @ 14:47)  --       LDH: (02-08 @ 14:47)  193        MEDICATIONS  (STANDING):  acetaminophen     Tablet .. 975 milliGRAM(s) Oral every 6 hours  influenza   Vaccine 0.5 milliLiter(s) IntraMuscular once  lactated ringers. 1000 milliLiter(s) (125 mL/Hr) IV Continuous <Continuous>  sertraline 100 milliGRAM(s) Oral daily  triamcinolone 0.1% Cream 1 Application(s) Topical four times a day  witch hazel Pads 1 Application(s) Topical four times a day    MEDICATIONS  (PRN):  albuterol    90 MICROgram(s) HFA Inhaler 2 Puff(s) Inhalation every 6 hours PRN Shortness of Breath and/or Wheezing   R3 Antepartum Note    Patient seen and examined at bedside, no acute overnight events. States that headache remains unresolved but is mild. Pt has a hx of migraines which she receives botox injections for outside of pregnancy but is not taking any medications. Also reports that her vision has been on and off blurry for the last several days but denies scotoma. Patient otherwise denies RUQ/epigastric pain, CP, SOB, N/V, fevers, and chills. Pt reports +FM, denies LOF, VB, ctx.    Vital Signs Last 24 Hours  T(C): 36.6 (02-09-24 @ 05:31), Max: 36.8 (02-08-24 @ 18:36)  HR: 67 (02-09-24 @ 05:31) (67 - 101)  BP: 104/58 (02-09-24 @ 05:31) (101/58 - 141/87)  RR: 16 (02-09-24 @ 05:31) (15 - 18)  SpO2: 97% (02-09-24 @ 05:31) (97% - 97%)    CAPILLARY BLOOD GLUCOSE          Physical Exam:  General: NAD  Abdomen: Soft, non-tender, gravid  Ext: No pain or swelling    Labs:             10.7   11.20 )-----------( 181      ( 02-08 @ 14:47 )             30.6     02-08 @ 14:47    139  |  106  |  8   ----------------------------<  128  3.4   |  22  |  0.60    Ca    8.3      02-08 @ 14:47    TPro  5.9  /  Alb  2.8  /  TBili  0.2  /  DBili  x   /  AST  14  /  ALT  10  /  AlkPhos  128  02-08 @ 14:47    PT/INR - ( 02-08 @ 14:47 )   PT: 10.8 sec;   INR: 0.96 ratio    PTT - ( 02-08 @ 14:47 )  PTT:28.7 sec    Uric Acid: (02-08 @ 14:47)  4.5      Fibrinogen: (02-08 @ 14:47)  --       LDH: (02-08 @ 14:47)  193        MEDICATIONS  (STANDING):  acetaminophen     Tablet .. 975 milliGRAM(s) Oral every 6 hours  influenza   Vaccine 0.5 milliLiter(s) IntraMuscular once  lactated ringers. 1000 milliLiter(s) (125 mL/Hr) IV Continuous <Continuous>  sertraline 100 milliGRAM(s) Oral daily  triamcinolone 0.1% Cream 1 Application(s) Topical four times a day  witch hazel Pads 1 Application(s) Topical four times a day    MEDICATIONS  (PRN):  albuterol    90 MICROgram(s) HFA Inhaler 2 Puff(s) Inhalation every 6 hours PRN Shortness of Breath and/or Wheezing

## 2024-02-09 NOTE — PROGRESS NOTE ADULT - ASSESSMENT
38 year old female, 35 weeks pregnant, with perirectal pain 2/2 thrombosed hemorrhoid, patient had pain for 3 days prior to admission.    Plan:  - No acute colorectal surgery interventions at this time  - Recommend conservative management with the following:  * High fiber diet  * Staying hydrated  * Sitz baths four times per day  * Bowel regimen  * Witch hazel  * Lidocaine cream    A Team [id number]

## 2024-02-09 NOTE — PROGRESS NOTE ADULT - SUBJECTIVE AND OBJECTIVE BOX
Patient is a 38y old  Female who presents with a chief complaint of headache and lower limb parathesias    HPI:  39yo female  FELICIAON 3/10/24 presents @35.4 weeks with complaints of hemorrhoid pain, vision changes this morning and last night, headache, b/l LE numbness, Left arm numbness, and b/l LE swelling. States she saw black spots last night and this morning. She took tylenol @3am for her headache which slightly helped. She placed H-prep last night and had a BM. Admits burning while she urinates. Denies shortness of breath, fever, chills, RUQ pain, epigastric pain or cough.  Denies vaginal bleeding, leakage of fluids, or contractions today. Reports positive fetal movement.    Antepartum Course: IVF pregnancy, anatomy scan wnl, fetal echo wnl, GCT abnormal GTT wnl, GBS unknown, NIPT low risk, resolved low lying placenta, Anxiety on zoloft 100mg daily, ASA daily,   Prenatal labs:  -T&S: A+  -Rubella: Immune  -Hep B: Nonreactive  -HIV: Nonreactive  -RPR: Negative  Ultrasounds: Last M sonogram EFW 4#11 (2024 17:41)      PAST MEDICAL & SURGICAL HISTORY:  Asthma  childhood-no meds      Other iron deficiency anemia      Spontaneous   no d&c      Migraines      Anxiety      H/O LEEP      Bilateral recurrent inguinal hernia without obstruction or gangrene  as infant      History of tonsillectomy  age 9      History of sinus surgery  5yrs ago      History of third molar tooth extraction, unspecified edentulism class        H/O LEEP      H/O unilateral salpingectomy          MEDICATIONS  (STANDING):  acetaminophen     Tablet .. 975 milliGRAM(s) Oral every 6 hours  influenza   Vaccine 0.5 milliLiter(s) IntraMuscular once  lactated ringers. 1000 milliLiter(s) (125 mL/Hr) IV Continuous <Continuous>  sertraline 100 milliGRAM(s) Oral daily  triamcinolone 0.1% Cream 1 Application(s) Topical four times a day  witch hazel Pads 1 Application(s) Topical four times a day              Vital Signs Last 24 Hrs  T(C): 36.7 (2024 13:28), Max: 36.8 (2024 18:36)  T(F): 98.1 (2024 13:28), Max: 98.24 (2024 20:30)  HR: 82 (2024 13:29) (67 - 101)  BP: 119/65 (2024 13:28) (101/58 - 141/87)  BP(mean): --  RR: 17 (2024 13:28) (15 - 18)  SpO2: 96% (2024 13:29) (96% - 97%)    Parameters below as of 2024 13:28  Patient On (Oxygen Delivery Method): room air        PHYSICAL EXAM:  No complains at this time  No headaches and no lower limb symptoms  Cleared by neurology and no further work up was needed  FHR cat 1  As she has clinically improved will discharge her home today        I&O's Summary      LABORATORY:                        10.2   8.59  )-----------( 175      ( 2024 08:12 )             29.8     02    139  |  107  |  8   ----------------------------<  118<H>  3.5   |  23  |  0.73    Ca    8.4      2024 08:12    TPro  5.5<L>  /  Alb  2.7<L>  /  TBili  0.2  /  DBili  x   /  AST  13  /  ALT  7   /  AlkPhos  122<H>  02-09    PT/INR - ( 2024 08:12 )   PT: 11.0 sec;   INR: 0.97 ratio         PTT - ( 2024 08:12 )  PTT:27.4 sec  Urinalysis Basic - ( 2024 08:12 )    Color: x / Appearance: x / SG: x / pH: x  Gluc: 118 mg/dL / Ketone: x  / Bili: x / Urobili: x   Blood: x / Protein: x / Nitrite: x   Leuk Esterase: x / RBC: x / WBC x   Sq Epi: x / Non Sq Epi: x / Bacteria: x

## 2024-02-09 NOTE — DISCHARGE NOTE ANTEPARTUM - CARE PLAN
1 Principal Discharge DX:	Hemorrhoid  Assessment and plan of treatment:	39yo  @35w5d a/f blood pressure monitoring and neurology workup.    Plan:  -F/up with OB in 1 week

## 2024-02-09 NOTE — PROGRESS NOTE ADULT - ASSESSMENT
37 yo  at 35w5d GA with hx of migraine headaches who is admitted for BP monitoring in setting of reports of persistent headache, blurry vision and LE numbness/weakness. HELLP labs and BPs wnl thus far. Maternal and fetal status reassuring overnight.  39 yo  at 35w5d GA with hx of migraine headaches who is admitted for BP monitoring in setting of reports of persistent headache, blurry vision and LE numbness/weakness. HELLP labs and BPs wnl thus far. Maternal and fetal status reassuring overnight.     #Headache/Neurological symptoms, r/o sPEC   -  39 yo  at 35w5d GA with hx of migraine headaches who is admitted for BP monitoring in setting of reports of persistent headache, blurry vision and LE numbness/weakness. HELLP labs and BPs wnl thus far. Maternal and fetal status reassuring overnight.     #Headache/Neurological symptoms, r/o sPEC   - HELLP labs wnl   - BPs mild range; continue to monitor    - Tylenol/Reglan/Benadryl prn for headache   - 24h urine   - neuro consult for lower extremity weakness/tingling   - Neuro recs: Peripheral neuropathy work up: B1, B6, B12, folate, methylmalonic acid, homocysteine, Vit E, Heavy metal toxicology screen (lead, arsenic, mercury), RPR, West Nile IgG/IgM, Lyme IgG/IgM, ganglioside antibodies, HIV assay, TSH, Hba1c    #Hemorrhoid   - s/p colorectal surgery consult, rec : conservative management with the following:      + High fiber diet      +Staying hydrated      +Sitz baths four times per day      +Bowel regimen      +Witch hazel      +Lidocaine cream  - appreciate recs above     #maternal wellbeing   - regular diet   - HSQ/Venodynes for DVT prophylaxis   - PNV/Fe     #Fetal wellbeing   - NST BID   - ATU scan today     ARUNA Childers PGY3

## 2024-02-09 NOTE — PROGRESS NOTE ADULT - ATTENDING COMMENTS
Patient seen and examined and history reviewed.  Currently has no complains of headache.  FHR tracing has been cat 1  Was seen by neurologist and will follow their recommendations  No signs of preeclampsia  Will continue to follow

## 2024-02-09 NOTE — DISCHARGE NOTE ANTEPARTUM - MEDICATION SUMMARY - MEDICATIONS TO TAKE
I will START or STAY ON the medications listed below when I get home from the hospital:    iron  -- Indication: For Other pregnancy-related conditions, antepartum    prenatal vitamins  -- Indication: For Other pregnancy-related conditions, antepartum    aspirin 81 mg oral tablet  -- orally  -- Indication: For Other pregnancy-related conditions, antepartum    Zoloft 100 mg oral tablet  -- 1 tab(s) by mouth once a day  -- Indication: For Other pregnancy-related conditions, antepartum    albuterol 90 mcg/inh inhalation aerosol  -- 2 puff(s) inhaled every 6 hours, As needed, Shortness of Breath and/or Wheezing  -- Indication: For Other pregnancy-related conditions, antepartum

## 2024-02-09 NOTE — PROGRESS NOTE ADULT - SUBJECTIVE AND OBJECTIVE BOX
Surgery Progress Note    Overnight Events: No acute events overnight.    SUBJECTIVE: Patient seen and examined at bedside with surgical team, patient still having perirectal pain but improved with lidocaine cream and witch hazel. Denies fever, chills, CP, SOB nausea, vomiting, dizziness.      OBJECTIVE:    Vital Signs Last 24 Hrs  T(C): 36.6 (2024 05:31), Max: 36.8 (2024 18:36)  T(F): 97.9 (2024 05:31), Max: 98.24 (2024 20:30)  HR: 67 (2024 05:31) (67 - 101)  BP: 104/58 (2024 05:31) (101/58 - 141/87)  BP(mean): --  RR: 16 (2024 05:31) (15 - 18)  SpO2: 97% (2024 05:31) (97% - 97%)    Parameters below as of 2024 05:31  Patient On (Oxygen Delivery Method): room air    I&O's Detail  MEDICATIONS  (STANDING):  acetaminophen     Tablet .. 975 milliGRAM(s) Oral every 6 hours  influenza   Vaccine 0.5 milliLiter(s) IntraMuscular once  lactated ringers. 1000 milliLiter(s) (125 mL/Hr) IV Continuous <Continuous>  sertraline 100 milliGRAM(s) Oral daily  triamcinolone 0.1% Cream 1 Application(s) Topical four times a day  witch hazel Pads 1 Application(s) Topical four times a day    MEDICATIONS  (PRN):  albuterol    90 MICROgram(s) HFA Inhaler 2 Puff(s) Inhalation every 6 hours PRN Shortness of Breath and/or Wheezing      PHYSICAL EXAM:  Constitutional: A&Ox3, NAD  Respiratory: Unlabored breathing  Abdomen: Soft, nondistended, NTTP. No rebound or guarding.   BETO: right lateral enlarged/thrombosed hemorrhoid  Extremities: WWP, LAWSON spontaneously    LABS:                        10.7   11.20 )-----------( 181      ( 2024 14:47 )             30.6     02-08    139  |  106  |  8   ----------------------------<  128<H>  3.4<L>   |  22  |  0.60    Ca    8.3<L>      2024 14:47    TPro  5.9<L>  /  Alb  2.8<L>  /  TBili  0.2  /  DBili  x   /  AST  14  /  ALT  10  /  AlkPhos  128<H>  02-08    PT/INR - ( 2024 14:47 )   PT: 10.8 sec;   INR: 0.96 ratio         PTT - ( 2024 14:47 )  PTT:28.7 sec  LIVER FUNCTIONS - ( 2024 14:47 )  Alb: 2.8 g/dL / Pro: 5.9 g/dL / ALK PHOS: 128 U/L / ALT: 10 U/L / AST: 14 U/L / GGT: x           Urinalysis Basic - ( 2024 14:47 )    Color: Yellow / Appearance: Clear / S.018 / pH: x  Gluc: 128 mg/dL / Ketone: Trace mg/dL  / Bili: Negative / Urobili: 0.2 mg/dL   Blood: x / Protein: Negative mg/dL / Nitrite: Negative   Leuk Esterase: Moderate / RBC: 0 /HPF / WBC 4 /HPF   Sq Epi: x / Non Sq Epi: 6 /HPF / Bacteria: Occasional /HPF        IMAGING:

## 2024-02-09 NOTE — DISCHARGE NOTE ANTEPARTUM - CARE PROVIDER_API CALL
Burt Winslow  Obstetrics and Gynecology  [address]  [place] [address]  Phone: [phone]  Fax: [phone]  Follow Up Time: 1 week    Pascual Grimm)  Neurology  [address]  [place] [address]  Phone: [phone]  Fax: [phone]  Follow Up Time: 2 weeks

## 2024-02-09 NOTE — DISCHARGE NOTE ANTEPARTUM - PROVIDER TOKENS
PROVIDER:[TOKEN:[[id number]],FOLLOWUP:[1 week]],PROVIDER:[TOKEN:[[id number]],FOLLOWUP:[2 weeks]]

## 2024-02-10 DIAGNOSIS — Z04.9 ENCOUNTER FOR EXAMINATION AND OBSERVATION FOR UNSPECIFIED REASON: ICD-10-CM

## 2024-02-10 LAB
CULTURE RESULTS: SIGNIFICANT CHANGE UP
SPECIMEN SOURCE: SIGNIFICANT CHANGE UP

## 2024-02-13 LAB
GD1A GANGL IGG+IGM SER IA-ACNC: 8 IV — SIGNIFICANT CHANGE UP (ref 0–50)
GD1B GANGL IGG+IGM SER IA-ACNC: 12 IV — SIGNIFICANT CHANGE UP (ref 0–50)
GM1 ASIALO IGG+IGM SER IA-ACNC: 22 IV — SIGNIFICANT CHANGE UP (ref 0–50)
GM1 GANGL IGG+IGM SER-ACNC: 13 IV — SIGNIFICANT CHANGE UP (ref 0–50)
GM2 GANGL IGG+IGM SER IA-ACNC: 17 IV — SIGNIFICANT CHANGE UP (ref 0–50)
GQ1B GANGL IGG+IGM SER IA-ACNC: 18 IV — SIGNIFICANT CHANGE UP (ref 0–50)

## 2024-02-14 LAB
A-TOCOPHEROL VIT E SERPL-MCNC: 16.6 MG/L — SIGNIFICANT CHANGE UP (ref 5.9–19.4)
BETA+GAMMA TOCOPHEROL SERPL-MCNC: 1.7 MG/L — SIGNIFICANT CHANGE UP (ref 0.7–4.9)

## 2024-02-15 LAB
ARSENIC SERPL-MCNC: 5 UG/L — SIGNIFICANT CHANGE UP (ref 0–9)
CADMIUM SERPL-MCNC: <0.5 UG/L — SIGNIFICANT CHANGE UP (ref 0–1.2)
LEAD BLD-MCNC: <1 UG/DL — SIGNIFICANT CHANGE UP (ref 0–3.4)
MERCURY SERPL-MCNC: <1 UG/L — SIGNIFICANT CHANGE UP (ref 0–14.9)

## 2024-02-18 LAB — PYRIDOXAL PHOS SERPL-MCNC: 2.4 UG/L — LOW (ref 3.4–65.2)

## 2024-02-20 ENCOUNTER — APPOINTMENT (OUTPATIENT)
Dept: OBGYN | Facility: CLINIC | Age: 39
End: 2024-02-20
Payer: COMMERCIAL

## 2024-02-20 VITALS
BODY MASS INDEX: 26.93 KG/M2 | DIASTOLIC BLOOD PRESSURE: 70 MMHG | WEIGHT: 152 LBS | HEIGHT: 63 IN | SYSTOLIC BLOOD PRESSURE: 130 MMHG

## 2024-02-20 PROBLEM — Z98.890 OTHER SPECIFIED POSTPROCEDURAL STATES: Chronic | Status: ACTIVE | Noted: 2024-02-08

## 2024-02-20 LAB
BASOPHILS # BLD AUTO: 0.03 K/UL
BASOPHILS NFR BLD AUTO: 0.3 %
BILIRUB UR QL STRIP: NORMAL
EOSINOPHIL # BLD AUTO: 0.03 K/UL
EOSINOPHIL NFR BLD AUTO: 0.3 %
GLUCOSE UR-MCNC: NORMAL
HCG UR QL: 0.2 EU/DL
HCT VFR BLD CALC: 34.2 %
HGB BLD-MCNC: 11.7 G/DL
HGB UR QL STRIP.AUTO: NORMAL
IMM GRANULOCYTES NFR BLD AUTO: 0.3 %
KETONES UR-MCNC: NORMAL
LEUKOCYTE ESTERASE UR QL STRIP: NORMAL
LYMPHOCYTES # BLD AUTO: 1.55 K/UL
LYMPHOCYTES NFR BLD AUTO: 17.6 %
MAN DIFF?: NORMAL
MCHC RBC-ENTMCNC: 31.3 PG
MCHC RBC-ENTMCNC: 34.2 GM/DL
MCV RBC AUTO: 91.4 FL
MONOCYTES # BLD AUTO: 0.58 K/UL
MONOCYTES NFR BLD AUTO: 6.6 %
NEUTROPHILS # BLD AUTO: 6.59 K/UL
NEUTROPHILS NFR BLD AUTO: 74.9 %
NITRITE UR QL STRIP: NORMAL
PH UR STRIP: 6
PLATELET # BLD AUTO: 189 K/UL
PROT UR STRIP-MCNC: NORMAL
RBC # BLD: 3.74 M/UL
RBC # FLD: 12.8 %
SP GR UR STRIP: 1.02
WBC # FLD AUTO: 8.81 K/UL

## 2024-02-20 PROCEDURE — 0502F SUBSEQUENT PRENATAL CARE: CPT

## 2024-02-20 PROCEDURE — 36415 COLL VENOUS BLD VENIPUNCTURE: CPT

## 2024-02-21 LAB
GP B STREP DNA SPEC QL NAA+PROBE: NOT DETECTED
HIV1+2 AB SPEC QL IA.RAPID: NONREACTIVE
SOURCE GBS: NORMAL

## 2024-02-27 ENCOUNTER — NON-APPOINTMENT (OUTPATIENT)
Age: 39
End: 2024-02-27

## 2024-02-27 ENCOUNTER — APPOINTMENT (OUTPATIENT)
Dept: OBGYN | Facility: CLINIC | Age: 39
End: 2024-02-27
Payer: COMMERCIAL

## 2024-02-27 VITALS
SYSTOLIC BLOOD PRESSURE: 127 MMHG | WEIGHT: 154 LBS | HEIGHT: 63 IN | BODY MASS INDEX: 27.29 KG/M2 | DIASTOLIC BLOOD PRESSURE: 70 MMHG

## 2024-02-27 LAB
BILIRUB UR QL STRIP: NORMAL
GLUCOSE UR-MCNC: NORMAL
HCG UR QL: 1 EU/DL
HGB UR QL STRIP.AUTO: NORMAL
KETONES UR-MCNC: NORMAL
LEUKOCYTE ESTERASE UR QL STRIP: NORMAL
NITRITE UR QL STRIP: NORMAL
PH UR STRIP: 6
PROT UR STRIP-MCNC: NORMAL
SP GR UR STRIP: 1.02

## 2024-02-27 PROCEDURE — 76818 FETAL BIOPHYS PROFILE W/NST: CPT

## 2024-02-27 PROCEDURE — 0502F SUBSEQUENT PRENATAL CARE: CPT

## 2024-03-05 ENCOUNTER — INPATIENT (INPATIENT)
Facility: HOSPITAL | Age: 39
LOS: 2 days | Discharge: HOME | End: 2024-03-08
Attending: OBSTETRICS & GYNECOLOGY | Admitting: OBSTETRICS & GYNECOLOGY
Payer: COMMERCIAL

## 2024-03-05 ENCOUNTER — APPOINTMENT (OUTPATIENT)
Dept: OBGYN | Facility: CLINIC | Age: 39
End: 2024-03-05

## 2024-03-05 VITALS
RESPIRATION RATE: 16 BRPM | DIASTOLIC BLOOD PRESSURE: 89 MMHG | TEMPERATURE: 98 F | SYSTOLIC BLOOD PRESSURE: 135 MMHG | HEART RATE: 81 BPM

## 2024-03-05 DIAGNOSIS — Z34.90 ENCOUNTER FOR SUPERVISION OF NORMAL PREGNANCY, UNSPECIFIED, UNSPECIFIED TRIMESTER: ICD-10-CM

## 2024-03-05 DIAGNOSIS — Z90.79 ACQUIRED ABSENCE OF OTHER GENITAL ORGAN(S): Chronic | ICD-10-CM

## 2024-03-05 DIAGNOSIS — Z98.890 OTHER SPECIFIED POSTPROCEDURAL STATES: Chronic | ICD-10-CM

## 2024-03-05 DIAGNOSIS — Z90.89 ACQUIRED ABSENCE OF OTHER ORGANS: Chronic | ICD-10-CM

## 2024-03-05 DIAGNOSIS — K08.409 PARTIAL LOSS OF TEETH, UNSPECIFIED CAUSE, UNSPECIFIED CLASS: Chronic | ICD-10-CM

## 2024-03-05 DIAGNOSIS — K40.21 BILATERAL INGUINAL HERNIA, WITHOUT OBSTRUCTION OR GANGRENE, RECURRENT: Chronic | ICD-10-CM

## 2024-03-05 LAB
BASOPHILS # BLD AUTO: 0.02 K/UL — SIGNIFICANT CHANGE UP (ref 0–0.2)
BASOPHILS NFR BLD AUTO: 0.2 % — SIGNIFICANT CHANGE UP (ref 0–2)
EOSINOPHIL # BLD AUTO: 0.04 K/UL — SIGNIFICANT CHANGE UP (ref 0–0.5)
EOSINOPHIL NFR BLD AUTO: 0.4 % — SIGNIFICANT CHANGE UP (ref 0–6)
HCT VFR BLD CALC: 31.1 % — LOW (ref 34.5–45)
HGB BLD-MCNC: 10.9 G/DL — LOW (ref 11.5–15.5)
IANC: 8.45 K/UL — HIGH (ref 1.8–7.4)
IMM GRANULOCYTES NFR BLD AUTO: 0.4 % — SIGNIFICANT CHANGE UP (ref 0–0.9)
LYMPHOCYTES # BLD AUTO: 1.72 K/UL — SIGNIFICANT CHANGE UP (ref 1–3.3)
LYMPHOCYTES # BLD AUTO: 15.7 % — SIGNIFICANT CHANGE UP (ref 13–44)
MCHC RBC-ENTMCNC: 30.7 PG — SIGNIFICANT CHANGE UP (ref 27–34)
MCHC RBC-ENTMCNC: 35 GM/DL — SIGNIFICANT CHANGE UP (ref 32–36)
MCV RBC AUTO: 87.6 FL — SIGNIFICANT CHANGE UP (ref 80–100)
MONOCYTES # BLD AUTO: 0.66 K/UL — SIGNIFICANT CHANGE UP (ref 0–0.9)
MONOCYTES NFR BLD AUTO: 6 % — SIGNIFICANT CHANGE UP (ref 2–14)
NEUTROPHILS # BLD AUTO: 8.45 K/UL — HIGH (ref 1.8–7.4)
NEUTROPHILS NFR BLD AUTO: 77.3 % — HIGH (ref 43–77)
NRBC # BLD: 0 /100 WBCS — SIGNIFICANT CHANGE UP (ref 0–0)
NRBC # FLD: 0 K/UL — SIGNIFICANT CHANGE UP (ref 0–0)
PLATELET # BLD AUTO: 174 K/UL — SIGNIFICANT CHANGE UP (ref 150–400)
RBC # BLD: 3.55 M/UL — LOW (ref 3.8–5.2)
RBC # FLD: 12.2 % — SIGNIFICANT CHANGE UP (ref 10.3–14.5)
WBC # BLD: 10.93 K/UL — HIGH (ref 3.8–10.5)
WBC # FLD AUTO: 10.93 K/UL — HIGH (ref 3.8–10.5)

## 2024-03-05 RX ORDER — CHLORHEXIDINE GLUCONATE 213 G/1000ML
1 SOLUTION TOPICAL DAILY
Refills: 0 | Status: DISCONTINUED | OUTPATIENT
Start: 2024-03-05 | End: 2024-03-06

## 2024-03-05 RX ORDER — OXYTOCIN 10 UNIT/ML
333.33 VIAL (ML) INJECTION
Qty: 20 | Refills: 0 | Status: DISCONTINUED | OUTPATIENT
Start: 2024-03-05 | End: 2024-03-05

## 2024-03-05 RX ORDER — SERTRALINE 25 MG/1
100 TABLET, FILM COATED ORAL DAILY
Refills: 0 | Status: DISCONTINUED | OUTPATIENT
Start: 2024-03-05 | End: 2024-03-08

## 2024-03-05 RX ORDER — INFLUENZA VIRUS VACCINE 15; 15; 15; 15 UG/.5ML; UG/.5ML; UG/.5ML; UG/.5ML
0.5 SUSPENSION INTRAMUSCULAR ONCE
Refills: 0 | Status: COMPLETED | OUTPATIENT
Start: 2024-03-05 | End: 2024-03-05

## 2024-03-05 RX ORDER — CHLORHEXIDINE GLUCONATE 213 G/1000ML
1 SOLUTION TOPICAL DAILY
Refills: 0 | Status: DISCONTINUED | OUTPATIENT
Start: 2024-03-05 | End: 2024-03-05

## 2024-03-05 RX ORDER — ACETAMINOPHEN 500 MG
1000 TABLET ORAL ONCE
Refills: 0 | Status: COMPLETED | OUTPATIENT
Start: 2024-03-05 | End: 2024-03-06

## 2024-03-05 RX ORDER — OXYTOCIN 10 UNIT/ML
333.33 VIAL (ML) INJECTION
Qty: 20 | Refills: 0 | Status: DISCONTINUED | OUTPATIENT
Start: 2024-03-05 | End: 2024-03-06

## 2024-03-05 RX ORDER — CITRIC ACID/SODIUM CITRATE 300-500 MG
15 SOLUTION, ORAL ORAL EVERY 6 HOURS
Refills: 0 | Status: DISCONTINUED | OUTPATIENT
Start: 2024-03-05 | End: 2024-03-06

## 2024-03-05 RX ORDER — SODIUM CHLORIDE 9 MG/ML
1000 INJECTION, SOLUTION INTRAVENOUS
Refills: 0 | Status: DISCONTINUED | OUTPATIENT
Start: 2024-03-05 | End: 2024-03-06

## 2024-03-05 RX ORDER — ALBUTEROL 90 UG/1
2 AEROSOL, METERED ORAL EVERY 6 HOURS
Refills: 0 | Status: DISCONTINUED | OUTPATIENT
Start: 2024-03-05 | End: 2024-03-08

## 2024-03-05 RX ADMIN — SODIUM CHLORIDE 125 MILLILITER(S): 9 INJECTION, SOLUTION INTRAVENOUS at 23:18

## 2024-03-05 RX ADMIN — CHLORHEXIDINE GLUCONATE 1 APPLICATION(S): 213 SOLUTION TOPICAL at 23:10

## 2024-03-05 NOTE — OB PROVIDER H&P - ASSESSMENT
A/P: Pt is a 37yo  @39.2wks, FELICIANO: 3/10/24 who presents as a scheduled elective IOL.  PNC significant for IVF pregnancy, AMA    1. Admit to LND. Routine Labs. IVF  2. IOL with buccal cytotec x2, then will transition to pitocin  3. Fetus: Cat X tracing, Vertex, EFW 3500g . C/w EFM.  4. GBS negative  5. Pain: IV pain meds/epidural PRN      MANDY Min  Discussed with Dr. Jackie Arroyo    A/P: Pt is a 39yo  @39.2wks, FELICIANO: 3/10/24 who presents as a scheduled elective IOL.  PNC significant for IVF pregnancy, AMA    1. Admit to LND. Routine Labs. IVF  2. IOL with buccal cytotec x2, then will transition to pitocin  3. Fetus: Cat X tracing, Vertex, EFW 3500g . C/w EFM.  4. GBS negative  5. Pain: IV pain meds/epidural PRN  6. Asthma- Albuterol PRN      TElle NP  Discussed with Dr. Jackie Arroyo

## 2024-03-05 NOTE — OB RN PATIENT PROFILE - DOES PATIENT HAVE ADVANCE DIRECTIVE
Changed dressing that Dr. Hyman Gain place. Dressing completely saturated. Used 4X4's and ABD pad and tape. Will continue to monitor patient. Yes

## 2024-03-05 NOTE — OB PROVIDER H&P - NSHPPHYSICALEXAM_GEN_ALL_CORE
Vital Signs Last 24 Hrs  T(C): 36.7 (05 Mar 2024 22:54), Max: 36.7 (05 Mar 2024 22:54)  T(F): 98.06 (05 Mar 2024 22:54), Max: 98.06 (05 Mar 2024 22:54)  HR: 81 (05 Mar 2024 22:54) (81 - 81)  BP: 135/89 (05 Mar 2024 22:54) (135/89 - 135/89)  BP(mean): --  RR: 16 (05 Mar 2024 22:54) (16 - 16)  SpO2: --        Physical Exam:  Gen: NAD, AxOx3  CV: RRR  Resp: CTAB  Abd: soft, NT, gravid        SVE: 3/70/-3  FHT: Category 1  [place]: Irregular  EFW: 3500g  Sono: Cephalic

## 2024-03-05 NOTE — OB RN PATIENT PROFILE - GRAVIDA, OB PROFILE
Care Everywhere updates requested and reviewed.  Immunizations reconciled. Media reports reviewed.  Duplicate HM overrides and  orders removed.  Overdue HM topic chart audit and/or requested.     Eye exam scheduled    Health Maintenance Due   Topic Date Due    Pneumococcal Vaccines (Age 0-64) (1 - PCV) Never done    Eye Exam  Never done    HIV Screening  Never done    Influenza Vaccine (1) 2023    COVID-19 Vaccine ( - -24 season) 2023    Hemoglobin A1c  2023        
5

## 2024-03-05 NOTE — OB RN PATIENT PROFILE - HEIGHT IN CM
2020     Jase Cheek (:  1962) is a 65 y.o. male, here for evaluation of the following medical concerns:    HPI  Hyperlipidemia:  No new myalgias or GI upset on rosuvastatin (Crestor). Medication compliance: compliant all of the time. Patient is not following a low fat, low cholesterol diet. He is not exercising regularly. Lab Results   Component Value Date    CHOL 147 2016    TRIG 325 (H) 2016    HDL 22 (L) 2016    LDLCALC see below 2016    LDLDIRECT 74 2016     Lab Results   Component Value Date    ALT 42 (H) 2019    AST 36 2019        Hypertension:  Home blood pressure monitoring: Yes - nightly, usually less than 537 systolic. He is not adherent to a low sodium diet. Patient denies chest pain, shortness of breath, headache, lightheadedness, blurred vision, peripheral edema and palpitations. Antihypertensive medication side effects: no medication side effects noted. Use of agents associated with hypertension: none. Sodium (mmol/L)   Date Value   2019 138    BUN (mg/dL)   Date Value   2019 13    Glucose (mg/dL)   Date Value   2019 111 (H)      Potassium (mmol/L)   Date Value   2019 3.5    CREATININE (mg/dL)   Date Value   2019 0.8 (L)          Afib: Does not feel symptoms and doesn't know when he's in AF. Takes coumadin. Goes to the clinic for INR checks. CAD s/p CABG: Stable, no chest pain. Follows up with cardiologist routinely. Hyperglycemia: Last A1c 5.8. No polyuria, polydipsia, paresthesias. Review of Systems  Per HPI    Prior to Visit Medications    Medication Sig Taking?  Authorizing Provider   rosuvastatin (CRESTOR) 20 MG tablet TAKE ONE TABLET BY MOUTH EVERY EVENING Yes Jenn Jerez MD   warfarin (COUMADIN) 5 MG tablet TAKE 1 TABLET (5 MG) OR 1.5 TABLETS (7.5 MG) BY MOUTH DAILY AS DIRECTED BY CLINIC Yes Denny Garcia MD   metoprolol tartrate (LOPRESSOR)
160.02

## 2024-03-05 NOTE — OB PROVIDER H&P - HISTORY OF PRESENT ILLNESS
HPI: Pt is a 39yo  @39.2wks, FELICIANO: 3/10/24, presenting as a scheduled elective IOL. Patient states she was r/o for PEC in February secondary to a mild range BP with significant headache and lower extremity swelling (HELLP labs/24 hr urine/BPs wnl). Patient denies any complaints at this time. Endorses positive FM. Denies LOF/VB/CTX.  PNC significant for IVF pregnancy, AMA  GBS negative  EFW: 3500g

## 2024-03-05 NOTE — OB PROVIDER H&P - NSLOWPPHRISK_OBGYN_A_OB
No previous uterine incision/[place] Pregnancy/Less than or equal to 4 previous vaginal births/No known bleeding disorder/No history of postpartum hemorrhage/No other PPH risks indicated

## 2024-03-05 NOTE — OB PROVIDER H&P - NS_OBGYNHISTORY_OBGYN_ALL_OB_FT
OB Hx:  SAB x1- unk year  TOP x1 no D&C- unk year  Ectopic x1 with right salpingectomy -10/2022  2020 36wk PPROM  7#2oz

## 2024-03-06 ENCOUNTER — TRANSCRIPTION ENCOUNTER (OUTPATIENT)
Age: 39
End: 2024-03-06

## 2024-03-06 LAB
ALBUMIN SERPL ELPH-MCNC: 2.9 G/DL — LOW (ref 3.3–5)
ALP SERPL-CCNC: 166 U/L — HIGH (ref 40–120)
ALT FLD-CCNC: 8 U/L — SIGNIFICANT CHANGE UP (ref 4–33)
ANION GAP SERPL CALC-SCNC: 11 MMOL/L — SIGNIFICANT CHANGE UP (ref 7–14)
APPEARANCE UR: ABNORMAL
APTT BLD: 27.6 SEC — SIGNIFICANT CHANGE UP (ref 24.5–35.6)
AST SERPL-CCNC: 18 U/L — SIGNIFICANT CHANGE UP (ref 4–32)
BACTERIA # UR AUTO: NEGATIVE /HPF — SIGNIFICANT CHANGE UP
BASOPHILS # BLD AUTO: 0.03 K/UL — SIGNIFICANT CHANGE UP (ref 0–0.2)
BASOPHILS NFR BLD AUTO: 0.3 % — SIGNIFICANT CHANGE UP (ref 0–2)
BILIRUB SERPL-MCNC: 0.2 MG/DL — SIGNIFICANT CHANGE UP (ref 0.2–1.2)
BILIRUB UR-MCNC: NEGATIVE — SIGNIFICANT CHANGE UP
BLD GP AB SCN SERPL QL: NEGATIVE — SIGNIFICANT CHANGE UP
BUN SERPL-MCNC: 12 MG/DL — SIGNIFICANT CHANGE UP (ref 7–23)
CALCIUM SERPL-MCNC: 8.3 MG/DL — LOW (ref 8.4–10.5)
CAST: 2 /LPF — SIGNIFICANT CHANGE UP (ref 0–4)
CHLORIDE SERPL-SCNC: 105 MMOL/L — SIGNIFICANT CHANGE UP (ref 98–107)
CO2 SERPL-SCNC: 19 MMOL/L — LOW (ref 22–31)
COLOR SPEC: YELLOW — SIGNIFICANT CHANGE UP
CREAT ?TM UR-MCNC: 234 MG/DL — SIGNIFICANT CHANGE UP
CREAT SERPL-MCNC: 0.71 MG/DL — SIGNIFICANT CHANGE UP (ref 0.5–1.3)
DIFF PNL FLD: NEGATIVE — SIGNIFICANT CHANGE UP
EGFR: 112 ML/MIN/1.73M2 — SIGNIFICANT CHANGE UP
EOSINOPHIL # BLD AUTO: 0.05 K/UL — SIGNIFICANT CHANGE UP (ref 0–0.5)
EOSINOPHIL NFR BLD AUTO: 0.4 % — SIGNIFICANT CHANGE UP (ref 0–6)
FIBRINOGEN PPP-MCNC: 354 MG/DL — SIGNIFICANT CHANGE UP (ref 200–465)
GLUCOSE SERPL-MCNC: 88 MG/DL — SIGNIFICANT CHANGE UP (ref 70–99)
GLUCOSE UR QL: NEGATIVE MG/DL — SIGNIFICANT CHANGE UP
HCT VFR BLD CALC: 31.2 % — LOW (ref 34.5–45)
HGB BLD-MCNC: 10.7 G/DL — LOW (ref 11.5–15.5)
IANC: 8.64 K/UL — HIGH (ref 1.8–7.4)
IMM GRANULOCYTES NFR BLD AUTO: 0.6 % — SIGNIFICANT CHANGE UP (ref 0–0.9)
INR BLD: 0.93 RATIO — SIGNIFICANT CHANGE UP (ref 0.85–1.18)
KETONES UR-MCNC: ABNORMAL MG/DL
LDH SERPL L TO P-CCNC: 200 U/L — SIGNIFICANT CHANGE UP (ref 135–225)
LEUKOCYTE ESTERASE UR-ACNC: NEGATIVE — SIGNIFICANT CHANGE UP
LYMPHOCYTES # BLD AUTO: 19.3 % — SIGNIFICANT CHANGE UP (ref 13–44)
LYMPHOCYTES # BLD AUTO: 2.3 K/UL — SIGNIFICANT CHANGE UP (ref 1–3.3)
MAGNESIUM SERPL-MCNC: 5.2 MG/DL — HIGH (ref 1.6–2.6)
MCHC RBC-ENTMCNC: 30.1 PG — SIGNIFICANT CHANGE UP (ref 27–34)
MCHC RBC-ENTMCNC: 34.3 GM/DL — SIGNIFICANT CHANGE UP (ref 32–36)
MCV RBC AUTO: 87.6 FL — SIGNIFICANT CHANGE UP (ref 80–100)
MONOCYTES # BLD AUTO: 0.85 K/UL — SIGNIFICANT CHANGE UP (ref 0–0.9)
MONOCYTES NFR BLD AUTO: 7.1 % — SIGNIFICANT CHANGE UP (ref 2–14)
NEUTROPHILS # BLD AUTO: 8.64 K/UL — HIGH (ref 1.8–7.4)
NEUTROPHILS NFR BLD AUTO: 72.3 % — SIGNIFICANT CHANGE UP (ref 43–77)
NITRITE UR-MCNC: NEGATIVE — SIGNIFICANT CHANGE UP
NRBC # BLD: 0 /100 WBCS — SIGNIFICANT CHANGE UP (ref 0–0)
NRBC # FLD: 0 K/UL — SIGNIFICANT CHANGE UP (ref 0–0)
PH UR: 6.5 — SIGNIFICANT CHANGE UP (ref 5–8)
PLATELET # BLD AUTO: 156 K/UL — SIGNIFICANT CHANGE UP (ref 150–400)
POTASSIUM SERPL-MCNC: 3.7 MMOL/L — SIGNIFICANT CHANGE UP (ref 3.5–5.3)
POTASSIUM SERPL-SCNC: 3.7 MMOL/L — SIGNIFICANT CHANGE UP (ref 3.5–5.3)
PROT ?TM UR-MCNC: 60 MG/DL — SIGNIFICANT CHANGE UP
PROT SERPL-MCNC: 6 G/DL — SIGNIFICANT CHANGE UP (ref 6–8.3)
PROT UR-MCNC: 100 MG/DL
PROT/CREAT UR-RTO: 0.3 RATIO — HIGH (ref 0–0.2)
PROTHROM AB SERPL-ACNC: 10.5 SEC — SIGNIFICANT CHANGE UP (ref 9.5–13)
RBC # BLD: 3.56 M/UL — LOW (ref 3.8–5.2)
RBC # FLD: 12.4 % — SIGNIFICANT CHANGE UP (ref 10.3–14.5)
RBC CASTS # UR COMP ASSIST: 6 /HPF — HIGH (ref 0–4)
REVIEW: SIGNIFICANT CHANGE UP
RH IG SCN BLD-IMP: POSITIVE — SIGNIFICANT CHANGE UP
SODIUM SERPL-SCNC: 135 MMOL/L — SIGNIFICANT CHANGE UP (ref 135–145)
SP GR SPEC: 1.04 — HIGH (ref 1–1.03)
SQUAMOUS # UR AUTO: 5 /HPF — SIGNIFICANT CHANGE UP (ref 0–5)
T PALLIDUM AB TITR SER: NEGATIVE — SIGNIFICANT CHANGE UP
URATE SERPL-MCNC: 5.8 MG/DL — SIGNIFICANT CHANGE UP (ref 2.5–7)
UROBILINOGEN FLD QL: 1 MG/DL — SIGNIFICANT CHANGE UP (ref 0.2–1)
WBC # BLD: 11.94 K/UL — HIGH (ref 3.8–10.5)
WBC # FLD AUTO: 11.94 K/UL — HIGH (ref 3.8–10.5)
WBC UR QL: 22 /HPF — HIGH (ref 0–5)

## 2024-03-06 PROCEDURE — 59400 OBSTETRICAL CARE: CPT | Mod: U9

## 2024-03-06 RX ORDER — IBUPROFEN 200 MG
600 TABLET ORAL EVERY 6 HOURS
Refills: 0 | Status: DISCONTINUED | OUTPATIENT
Start: 2024-03-06 | End: 2024-03-08

## 2024-03-06 RX ORDER — MAGNESIUM SULFATE 500 MG/ML
4 VIAL (ML) INJECTION ONCE
Refills: 0 | Status: COMPLETED | OUTPATIENT
Start: 2024-03-06 | End: 2024-03-06

## 2024-03-06 RX ORDER — TETANUS TOXOID, REDUCED DIPHTHERIA TOXOID AND ACELLULAR PERTUSSIS VACCINE, ADSORBED 5; 2.5; 8; 8; 2.5 [IU]/.5ML; [IU]/.5ML; UG/.5ML; UG/.5ML; UG/.5ML
0.5 SUSPENSION INTRAMUSCULAR ONCE
Refills: 0 | Status: DISCONTINUED | OUTPATIENT
Start: 2024-03-06 | End: 2024-03-08

## 2024-03-06 RX ORDER — MAGNESIUM SULFATE 500 MG/ML
2 VIAL (ML) INJECTION
Qty: 40 | Refills: 0 | Status: DISCONTINUED | OUTPATIENT
Start: 2024-03-06 | End: 2024-03-06

## 2024-03-06 RX ORDER — OXYCODONE HYDROCHLORIDE 5 MG/1
5 TABLET ORAL ONCE
Refills: 0 | Status: DISCONTINUED | OUTPATIENT
Start: 2024-03-06 | End: 2024-03-08

## 2024-03-06 RX ORDER — SIMETHICONE 80 MG/1
80 TABLET, CHEWABLE ORAL EVERY 4 HOURS
Refills: 0 | Status: DISCONTINUED | OUTPATIENT
Start: 2024-03-06 | End: 2024-03-08

## 2024-03-06 RX ORDER — AER TRAVELER 0.5 G/1
1 SOLUTION RECTAL; TOPICAL EVERY 4 HOURS
Refills: 0 | Status: DISCONTINUED | OUTPATIENT
Start: 2024-03-06 | End: 2024-03-08

## 2024-03-06 RX ORDER — HYDROCORTISONE 1 %
1 OINTMENT (GRAM) TOPICAL EVERY 6 HOURS
Refills: 0 | Status: DISCONTINUED | OUTPATIENT
Start: 2024-03-06 | End: 2024-03-08

## 2024-03-06 RX ORDER — SODIUM CHLORIDE 0.65 %
1 AEROSOL, SPRAY (ML) NASAL ONCE
Refills: 0 | Status: COMPLETED | OUTPATIENT
Start: 2024-03-06 | End: 2024-03-06

## 2024-03-06 RX ORDER — SODIUM CHLORIDE 9 MG/ML
1000 INJECTION, SOLUTION INTRAVENOUS ONCE
Refills: 0 | Status: DISCONTINUED | OUTPATIENT
Start: 2024-03-06 | End: 2024-03-06

## 2024-03-06 RX ORDER — DIPHENHYDRAMINE HCL 50 MG
25 CAPSULE ORAL ONCE
Refills: 0 | Status: COMPLETED | OUTPATIENT
Start: 2024-03-06 | End: 2024-03-06

## 2024-03-06 RX ORDER — DIPHENHYDRAMINE HCL 50 MG
25 CAPSULE ORAL EVERY 6 HOURS
Refills: 0 | Status: DISCONTINUED | OUTPATIENT
Start: 2024-03-06 | End: 2024-03-08

## 2024-03-06 RX ORDER — OXYCODONE HYDROCHLORIDE 5 MG/1
5 TABLET ORAL
Refills: 0 | Status: DISCONTINUED | OUTPATIENT
Start: 2024-03-06 | End: 2024-03-08

## 2024-03-06 RX ORDER — OXYTOCIN 10 UNIT/ML
2 VIAL (ML) INJECTION
Qty: 30 | Refills: 0 | Status: DISCONTINUED | OUTPATIENT
Start: 2024-03-06 | End: 2024-03-06

## 2024-03-06 RX ORDER — ONDANSETRON 8 MG/1
4 TABLET, FILM COATED ORAL ONCE
Refills: 0 | Status: COMPLETED | OUTPATIENT
Start: 2024-03-06 | End: 2024-03-06

## 2024-03-06 RX ORDER — SODIUM CHLORIDE 9 MG/ML
3 INJECTION INTRAMUSCULAR; INTRAVENOUS; SUBCUTANEOUS EVERY 8 HOURS
Refills: 0 | Status: DISCONTINUED | OUTPATIENT
Start: 2024-03-06 | End: 2024-03-08

## 2024-03-06 RX ORDER — IBUPROFEN 200 MG
600 TABLET ORAL EVERY 6 HOURS
Refills: 0 | Status: COMPLETED | OUTPATIENT
Start: 2024-03-06 | End: 2025-02-02

## 2024-03-06 RX ORDER — BENZOCAINE 10 %
1 GEL (GRAM) MUCOUS MEMBRANE EVERY 6 HOURS
Refills: 0 | Status: DISCONTINUED | OUTPATIENT
Start: 2024-03-06 | End: 2024-03-08

## 2024-03-06 RX ORDER — METOCLOPRAMIDE HCL 10 MG
10 TABLET ORAL ONCE
Refills: 0 | Status: COMPLETED | OUTPATIENT
Start: 2024-03-06 | End: 2024-03-06

## 2024-03-06 RX ORDER — MAGNESIUM HYDROXIDE 400 MG/1
30 TABLET, CHEWABLE ORAL
Refills: 0 | Status: DISCONTINUED | OUTPATIENT
Start: 2024-03-06 | End: 2024-03-08

## 2024-03-06 RX ORDER — KETOROLAC TROMETHAMINE 30 MG/ML
30 SYRINGE (ML) INJECTION ONCE
Refills: 0 | Status: DISCONTINUED | OUTPATIENT
Start: 2024-03-06 | End: 2024-03-06

## 2024-03-06 RX ORDER — OXYTOCIN 10 UNIT/ML
41.67 VIAL (ML) INJECTION
Qty: 20 | Refills: 0 | Status: DISCONTINUED | OUTPATIENT
Start: 2024-03-06 | End: 2024-03-08

## 2024-03-06 RX ORDER — NIFEDIPINE 30 MG
30 TABLET, EXTENDED RELEASE 24 HR ORAL DAILY
Refills: 0 | Status: DISCONTINUED | OUTPATIENT
Start: 2024-03-06 | End: 2024-03-08

## 2024-03-06 RX ORDER — MAGNESIUM SULFATE 500 MG/ML
2 VIAL (ML) INJECTION
Qty: 40 | Refills: 0 | Status: DISCONTINUED | OUTPATIENT
Start: 2024-03-06 | End: 2024-03-07

## 2024-03-06 RX ORDER — DIBUCAINE 1 %
1 OINTMENT (GRAM) RECTAL EVERY 6 HOURS
Refills: 0 | Status: DISCONTINUED | OUTPATIENT
Start: 2024-03-06 | End: 2024-03-08

## 2024-03-06 RX ORDER — PRAMOXINE HYDROCHLORIDE 150 MG/15G
1 AEROSOL, FOAM RECTAL EVERY 4 HOURS
Refills: 0 | Status: DISCONTINUED | OUTPATIENT
Start: 2024-03-06 | End: 2024-03-08

## 2024-03-06 RX ORDER — SODIUM CHLORIDE 9 MG/ML
1000 INJECTION, SOLUTION INTRAVENOUS
Refills: 0 | Status: DISCONTINUED | OUTPATIENT
Start: 2024-03-06 | End: 2024-03-08

## 2024-03-06 RX ORDER — LANOLIN
1 OINTMENT (GRAM) TOPICAL EVERY 6 HOURS
Refills: 0 | Status: DISCONTINUED | OUTPATIENT
Start: 2024-03-06 | End: 2024-03-08

## 2024-03-06 RX ORDER — ACETAMINOPHEN 500 MG
975 TABLET ORAL
Refills: 0 | Status: DISCONTINUED | OUTPATIENT
Start: 2024-03-06 | End: 2024-03-08

## 2024-03-06 RX ORDER — SODIUM CHLORIDE 9 MG/ML
500 INJECTION, SOLUTION INTRAVENOUS ONCE
Refills: 0 | Status: DISCONTINUED | OUTPATIENT
Start: 2024-03-06 | End: 2024-03-06

## 2024-03-06 RX ADMIN — Medication 50 GM/HR: at 16:56

## 2024-03-06 RX ADMIN — Medication 1000 MILLIGRAM(S): at 01:00

## 2024-03-06 RX ADMIN — Medication 975 MILLIGRAM(S): at 22:35

## 2024-03-06 RX ADMIN — Medication 25 MILLIGRAM(S): at 19:16

## 2024-03-06 RX ADMIN — Medication 10 MILLIGRAM(S): at 19:16

## 2024-03-06 RX ADMIN — SERTRALINE 100 MILLIGRAM(S): 25 TABLET, FILM COATED ORAL at 07:43

## 2024-03-06 RX ADMIN — Medication 50 GM/HR: at 19:11

## 2024-03-06 RX ADMIN — ONDANSETRON 4 MILLIGRAM(S): 8 TABLET, FILM COATED ORAL at 08:19

## 2024-03-06 RX ADMIN — Medication 1 SPRAY(S): at 21:57

## 2024-03-06 RX ADMIN — Medication 50 GM/HR: at 14:19

## 2024-03-06 RX ADMIN — Medication 400 MILLIGRAM(S): at 00:00

## 2024-03-06 RX ADMIN — Medication 30 MILLIGRAM(S): at 22:30

## 2024-03-06 RX ADMIN — Medication 30 MILLIGRAM(S): at 12:48

## 2024-03-06 RX ADMIN — Medication 300 GRAM(S): at 13:53

## 2024-03-06 RX ADMIN — Medication 30 MILLIGRAM(S): at 13:30

## 2024-03-06 RX ADMIN — Medication 975 MILLIGRAM(S): at 22:05

## 2024-03-06 RX ADMIN — Medication 2 MILLIUNIT(S)/MIN: at 07:42

## 2024-03-06 RX ADMIN — Medication 975 MILLIGRAM(S): at 13:53

## 2024-03-06 NOTE — OB RN DELIVERY SUMMARY - NS_SEPSISRSKCALC_OBGYN_ALL_OB_FT
EOS calculated successfully. EOS Risk Factor: 0.5/1000 live births ([facility] national incidence); GA=39w3d; Temp=98.24; ROM=4.833; GBS='Negative'; Antibiotics='No antibiotics or any antibiotics < 2 hrs prior to birth'

## 2024-03-06 NOTE — OB RN DELIVERY SUMMARY - NSSELHIDDEN_OBGYN_ALL_OB_FT
[NS_DeliveryAttending1_OBGYN_ALL_OB_FT:[id number]],[NS_DeliveryAssist1_OBGYN_ALL_OB_FT:[id number]],[NS_DeliveryRN_OBGYN_ALL_OB_FT:[id number]]

## 2024-03-06 NOTE — DISCHARGE NOTE OB - PATIENT PORTAL LINK FT
You can access the FollowMyHealth Patient Portal offered by [facility] by registering at the following website: [email or web address]. By joining [facility]’s FollowMyHealth portal, you will also be able to view your health information using other applications (apps) compatible with our system.

## 2024-03-06 NOTE — DISCHARGE NOTE OB - MEDICATION SUMMARY - MEDICATIONS TO TAKE
I will START or STAY ON the medications listed below when I get home from the hospital:    Electronic Blood Pressure Monitor  -- Please take your Blood Pressure 3X/day.  Call your Doctor's office with readings over 140/90 or if you have  a severe headache, blurry vision or severe heartburn.  Go to the ER or Labor and delivery if readings are 160/100 or higher  Bring all readings with you to your next  Doctor's appointment.  -- Indication: For Vacuum extractor delivery, delivered    prenatal vitamins  -- Indication: For Vacuum extractor delivery, delivered    ibuprofen 600 mg oral tablet  -- 1 tab(s) by mouth every 6 hours  -- Indication: For Vacuum extractor delivery, delivered    acetaminophen 325 mg oral tablet  -- 3 tab(s) by mouth every 6 hours  -- Indication: For Vacuum extractor delivery, delivered    albuterol 90 mcg/inh inhalation aerosol  -- 2 puff(s) inhaled every 6 hours As needed Shortness of Breath and/or Wheezing  -- Indication: For home med    NIFEdipine 30 mg oral tablet, extended release  -- 1 tab(s) by mouth once a day  -- Indication: For sPEC    guaiFENesin 600 mg oral tablet, extended release  -- 1 tab(s) by mouth every 12 hours  -- Indication: For home med

## 2024-03-06 NOTE — CHART NOTE - NSCHARTNOTEFT_GEN_A_CORE
AN  Patient seen comfortable with epidural  4cm cat 1  risk reducing induction  for arom and pitocin  7 1/2Spencer Winslow

## 2024-03-06 NOTE — OB PROVIDER DELIVERY SUMMARY - NSSELHIDDEN_OBGYN_ALL_OB_FT
[NS_DeliveryAttending1_OBGYN_ALL_OB_FT:[id number]],[NS_DeliveryAssist1_OBGYN_ALL_OB_FT:[email or web address]],[NS_DeliveryRN_OBGYN_ALL_OB_FT:[id number]]

## 2024-03-06 NOTE — DISCHARGE NOTE OB - CARE PROVIDER_API CALL
Burt Winslow  Obstetrics and Gynecology  [address], [address]  [place] [address]  Phone: [phone]  Fax: [phone]  Follow Up Time:

## 2024-03-06 NOTE — DISCHARGE NOTE OB - CARE PLAN
Principal Discharge DX:	Vacuum extractor delivery, delivered  Assessment and plan of treatment:	vavd  preeclampsia - magnesium   1

## 2024-03-06 NOTE — CHART NOTE - NSCHARTNOTEFT_GEN_A_CORE
Pt seen and evaluated for reassessment of headache after administration of Benadryl and Reglan. Pt was endorsing a headache, 7/10 pain, not responding to Tylenol. She wanted to defer Benadryl and reglan until her mother was here in case of side effects. Pt's mother came and left and Reglan/benadryl were given. Pt endorses improvement of headache symptoms, now 4/10, but is endorsing anxiety about the side effects of the benadryl. Pt states she feels drowsy and felt unsteady when assisted to the bathroom. She states she always feels "loopy" when given benadryl. She endorses shivering when in bathroom but "hot flashes" when in bed. Denies CP, SOB, RUQ pain, changes in her vision. She endorses some nasal congestion contributing to HA.    Vital Signs Last 24 Hrs  T(C): 36.5 (06 Mar 2024 20:00), Max: 36.9 (06 Mar 2024 18:00)  T(F): 97.7 (06 Mar 2024 20:00), Max: 98.4 (06 Mar 2024 18:00)  HR: 92 (06 Mar 2024 20:00) (64 - 142)  BP: 141/90 (06 Mar 2024 20:00) (111/60 - 167/105)  RR: 18 (06 Mar 2024 20:00) (15 - 18)  SpO2: 98% (06 Mar 2024 20:00) (84% - 100%)    Parameters below as of 06 Mar 2024 20:00  Patient On (Oxygen Delivery Method): room air      A/p 37yo  PPD#0 VAVD. Intrapartum course c/b sPEC currently on Mg. Pt endorsing HA on PPD#0 responsive to reglan and benadryl although pt endorsing increased anxiety with regards to side effects.  - Pt counseled on side effects of benadryl and increased feelings of drowsiness. Pt would not like benadryl in the future for headache as she does not like how it makes her feel.  - Ocean nasal spray prn ordered for congestion   - Pt encouraged to rest as needed, ambulate with assistance   - Pt reassured, all questions answered     Poornima Ramirez PGY-1

## 2024-03-06 NOTE — OB PROVIDER DELIVERY SUMMARY - NSPROVIDERDELIVERYNOTE_OBGYN_ALL_OB_FT
Patient fully dilated & pushing. Decision made for vaccuum assisted vaginal delivery. Infant delivered with kiwi vaccuum over one pull, no pop-offs noted. Head, shoulders & body delivered easily. Infant handed to mother. Cord clamped x 2 & cut. Infant to peds for assessment. Placenta delivered intact via gentle uterine massage. Fundus firm. On inspection a 1st degree laceration was noted & repaired in the usual fashion. Excellent hemostasis noted        Daisy OH

## 2024-03-06 NOTE — OB NEONATOLOGY/PEDIATRICIAN DELIVERY SUMMARY - NSPEDSNEONOTESA_OBGYN_ALL_OB_FT
Pediatrician called to delivery for Cat II tracing, vacuum assisted. Male infant born at  39+3/7 wks via VAVD to a 38 y/o  blood type A+ mother. Maternal history of Migraines (botox), Asthma, Anxiety, prior  , Postpartum depression.  Prenatal history of IVF pregnancy. Prenatal labs nr/immune/-, GBS - on . SROM at 0700 on 3/6 with clear. EOS score 0.08, highest maternal temperature 36.8. Baby emerged vigorous, crying. Infant was brought to radiant warmer and warmed, dried, stimulated and suctioned. HR>100, normal respiratory effort. APGARS of 8/9. Mom is initiating breast feeding and formula feeding. Consents to Hepatitis B vaccination. Desires for infant to be circumcised. Pediatrician is Dr. Kaylene Rodriguez (Loyd here).     BW: 3930g (LGA)  : 3/6/2024  TOB: 1150    Physical Exam:  Gen: no acute distress, +grimace  HEENT:  anterior fontanel open soft and flat, caput secundum present. nondysmorphic facies, no cleft lip/palate, ears normal set, no ear pits or tags, nares clinically patent  Resp: Normal respiratory effort without grunting or retractions, good air entry b/l, clear to auscultation bilaterally  Cardio: Present S1/S2, regular rate and rhythm, no murmurs  Abd: soft, non tender, non distended, umbilical cord with 3 vessels  Neuro: +palmar and plantar grasp, +suck, +kayce, normal tone  Extremities: negative gaytan and ortolani maneuvers, moving all extremities, no clavicular crepitus or stepoff  Skin: pink, warm  Genitals: Normal male anatomy, testicles palpable in scrotum b/l, Ken 1, anus patent

## 2024-03-06 NOTE — CHART NOTE - NSCHARTNOTEFT_GEN_A_CORE
PA note    seen at bedside. Feeling well    VS  T(C): 36.8 (03-06-24 @ 09:10)  HR: 78 (03-06-24 @ 13:41)  BP: 147/88 (03-06-24 @ 13:41)  RR: 16 (03-06-24 @ 09:10)  SpO2: 93% (03-06-24 @ 13:39)    320 ccs expressed from uterus  uterus firm    cont to monitor   consider cytotec if patient bleeds again  total   dw Dr Nayla carty PA note    seen at bedside. Feeling well    VS  T(C): 36.8 (03-06-24 @ 09:10)  HR: 78 (03-06-24 @ 13:41)  BP: 147/88 (03-06-24 @ 13:41)  RR: 16 (03-06-24 @ 09:10)  SpO2: 93% (03-06-24 @ 13:39)    320 ccs expressed from uterus  uterus firm    cont to monitor   consider cytotec if patient bleeds again  total   dw Dr Nayla carty    Addendum  notified by RN patient with unrelenting headache despite Toradol  given diagnosis of PEC will treat patient with Mg for PEC with severe features  sofi carty

## 2024-03-07 LAB
ALBUMIN SERPL ELPH-MCNC: 2.5 G/DL — LOW (ref 3.3–5)
ALP SERPL-CCNC: 134 U/L — HIGH (ref 40–120)
ALT FLD-CCNC: 9 U/L — SIGNIFICANT CHANGE UP (ref 4–33)
ANION GAP SERPL CALC-SCNC: 12 MMOL/L — SIGNIFICANT CHANGE UP (ref 7–14)
APTT BLD: 29.7 SEC — SIGNIFICANT CHANGE UP (ref 24.5–35.6)
AST SERPL-CCNC: 25 U/L — SIGNIFICANT CHANGE UP (ref 4–32)
BASOPHILS # BLD AUTO: 0.03 K/UL — SIGNIFICANT CHANGE UP (ref 0–0.2)
BASOPHILS NFR BLD AUTO: 0.2 % — SIGNIFICANT CHANGE UP (ref 0–2)
BILIRUB SERPL-MCNC: <0.2 MG/DL — SIGNIFICANT CHANGE UP (ref 0.2–1.2)
BUN SERPL-MCNC: 7 MG/DL — SIGNIFICANT CHANGE UP (ref 7–23)
CALCIUM SERPL-MCNC: 6.7 MG/DL — LOW (ref 8.4–10.5)
CHLORIDE SERPL-SCNC: 108 MMOL/L — HIGH (ref 98–107)
CO2 SERPL-SCNC: 20 MMOL/L — LOW (ref 22–31)
CREAT SERPL-MCNC: 0.8 MG/DL — SIGNIFICANT CHANGE UP (ref 0.5–1.3)
EGFR: 97 ML/MIN/1.73M2 — SIGNIFICANT CHANGE UP
EOSINOPHIL # BLD AUTO: 0.06 K/UL — SIGNIFICANT CHANGE UP (ref 0–0.5)
EOSINOPHIL NFR BLD AUTO: 0.5 % — SIGNIFICANT CHANGE UP (ref 0–6)
FIBRINOGEN PPP-MCNC: 369 MG/DL — SIGNIFICANT CHANGE UP (ref 200–465)
GLUCOSE SERPL-MCNC: 104 MG/DL — HIGH (ref 70–99)
HCT VFR BLD CALC: 28.7 % — LOW (ref 34.5–45)
HGB BLD-MCNC: 9.8 G/DL — LOW (ref 11.5–15.5)
IANC: 10.39 K/UL — HIGH (ref 1.8–7.4)
IMM GRANULOCYTES NFR BLD AUTO: 0.6 % — SIGNIFICANT CHANGE UP (ref 0–0.9)
INR BLD: <0.9 RATIO — SIGNIFICANT CHANGE UP (ref 0.85–1.18)
LDH SERPL L TO P-CCNC: 246 U/L — HIGH (ref 135–225)
LYMPHOCYTES # BLD AUTO: 1.71 K/UL — SIGNIFICANT CHANGE UP (ref 1–3.3)
LYMPHOCYTES # BLD AUTO: 13.3 % — SIGNIFICANT CHANGE UP (ref 13–44)
MAGNESIUM SERPL-MCNC: 5.9 MG/DL — HIGH (ref 1.6–2.6)
MAGNESIUM SERPL-MCNC: 5.9 MG/DL — HIGH (ref 1.6–2.6)
MCHC RBC-ENTMCNC: 30.9 PG — SIGNIFICANT CHANGE UP (ref 27–34)
MCHC RBC-ENTMCNC: 34.1 GM/DL — SIGNIFICANT CHANGE UP (ref 32–36)
MCV RBC AUTO: 90.5 FL — SIGNIFICANT CHANGE UP (ref 80–100)
MONOCYTES # BLD AUTO: 0.61 K/UL — SIGNIFICANT CHANGE UP (ref 0–0.9)
MONOCYTES NFR BLD AUTO: 4.7 % — SIGNIFICANT CHANGE UP (ref 2–14)
NEUTROPHILS # BLD AUTO: 10.39 K/UL — HIGH (ref 1.8–7.4)
NEUTROPHILS NFR BLD AUTO: 80.7 % — HIGH (ref 43–77)
NRBC # BLD: 0 /100 WBCS — SIGNIFICANT CHANGE UP (ref 0–0)
NRBC # FLD: 0 K/UL — SIGNIFICANT CHANGE UP (ref 0–0)
PLATELET # BLD AUTO: 183 K/UL — SIGNIFICANT CHANGE UP (ref 150–400)
POTASSIUM SERPL-MCNC: 3.5 MMOL/L — SIGNIFICANT CHANGE UP (ref 3.5–5.3)
POTASSIUM SERPL-SCNC: 3.5 MMOL/L — SIGNIFICANT CHANGE UP (ref 3.5–5.3)
PROT SERPL-MCNC: 5 G/DL — LOW (ref 6–8.3)
PROTHROM AB SERPL-ACNC: 9.9 SEC — SIGNIFICANT CHANGE UP (ref 9.5–13)
RBC # BLD: 3.17 M/UL — LOW (ref 3.8–5.2)
RBC # FLD: 12.3 % — SIGNIFICANT CHANGE UP (ref 10.3–14.5)
SODIUM SERPL-SCNC: 140 MMOL/L — SIGNIFICANT CHANGE UP (ref 135–145)
URATE SERPL-MCNC: 5.7 MG/DL — SIGNIFICANT CHANGE UP (ref 2.5–7)
WBC # BLD: 12.88 K/UL — HIGH (ref 3.8–10.5)
WBC # FLD AUTO: 12.88 K/UL — HIGH (ref 3.8–10.5)

## 2024-03-07 RX ORDER — SENNA PLUS 8.6 MG/1
1 TABLET ORAL ONCE
Refills: 0 | Status: COMPLETED | OUTPATIENT
Start: 2024-03-07 | End: 2024-03-07

## 2024-03-07 RX ADMIN — Medication 600 MILLIGRAM(S): at 12:00

## 2024-03-07 RX ADMIN — SODIUM CHLORIDE 3 MILLILITER(S): 9 INJECTION INTRAMUSCULAR; INTRAVENOUS; SUBCUTANEOUS at 12:41

## 2024-03-07 RX ADMIN — Medication 975 MILLIGRAM(S): at 09:00

## 2024-03-07 RX ADMIN — Medication 975 MILLIGRAM(S): at 20:50

## 2024-03-07 RX ADMIN — Medication 600 MILLIGRAM(S): at 11:00

## 2024-03-07 RX ADMIN — Medication 1 TABLET(S): at 11:57

## 2024-03-07 RX ADMIN — SENNA PLUS 1 TABLET(S): 8.6 TABLET ORAL at 20:18

## 2024-03-07 RX ADMIN — Medication 600 MILLIGRAM(S): at 05:14

## 2024-03-07 RX ADMIN — Medication 975 MILLIGRAM(S): at 03:06

## 2024-03-07 RX ADMIN — Medication 600 MILLIGRAM(S): at 08:19

## 2024-03-07 RX ADMIN — Medication 600 MILLIGRAM(S): at 18:00

## 2024-03-07 RX ADMIN — SERTRALINE 100 MILLIGRAM(S): 25 TABLET, FILM COATED ORAL at 08:19

## 2024-03-07 RX ADMIN — Medication 975 MILLIGRAM(S): at 14:02

## 2024-03-07 RX ADMIN — Medication 975 MILLIGRAM(S): at 14:59

## 2024-03-07 RX ADMIN — Medication 975 MILLIGRAM(S): at 20:18

## 2024-03-07 RX ADMIN — Medication 50 GM/HR: at 07:28

## 2024-03-07 RX ADMIN — Medication 600 MILLIGRAM(S): at 05:45

## 2024-03-07 RX ADMIN — Medication 975 MILLIGRAM(S): at 08:19

## 2024-03-07 RX ADMIN — Medication 975 MILLIGRAM(S): at 03:36

## 2024-03-07 RX ADMIN — Medication 600 MILLIGRAM(S): at 20:19

## 2024-03-07 RX ADMIN — Medication 600 MILLIGRAM(S): at 23:25

## 2024-03-07 RX ADMIN — Medication 600 MILLIGRAM(S): at 17:01

## 2024-03-07 RX ADMIN — Medication 30 MILLIGRAM(S): at 22:05

## 2024-03-07 NOTE — PROVIDER CONTACT NOTE (OTHER) - BACKGROUND
PP VAVD, PEC, pcr 0.3,m bp's 140-150's/80-90/s
Vac assisted Nsd 3/6 @ 1150. PEC w/ SF-s/p Mag. QBL initially 173 + additional 320. QBL total 492. Hx of anxiety

## 2024-03-07 NOTE — PROVIDER CONTACT NOTE (OTHER) - ASSESSMENT
headache 7/10 unrelieved by Toni
Called into patient room. Pt in bathroom and states she passed a clot. Pt assisted back to bed. Pt states she was just breastfeeding and felt a "gush". Egg sized clot in toilet- weighed 33g. Fundus midline and firm. Bleeding light.

## 2024-03-07 NOTE — PROVIDER CONTACT NOTE (OTHER) - SITUATION
Pt PP VAVD, headache 7/10 unrelieved by Toradol, PEC, pcr 0.3,m bp's 140-150's/80-90/s
Called in patient room. Pt passed egg sized clot

## 2024-03-07 NOTE — PROVIDER CONTACT NOTE (OTHER) - ACTION/TREATMENT ORDERED:
MD Marlyn Singletary made aware.   2140: MD Poornima Ramirez @ bedside to assess pt.
Magnesium to be started and tylenol to be given

## 2024-03-07 NOTE — PROGRESS NOTE ADULT - ASSESSMENT
A/P: 37yo PPD#1 s/p VAVD complicated by sPEC currently receiving 24hr postpartum MgSO4 gtt.  Patient is stable and doing well post-partum.  - +320, lochia now WNL  - Hct: 31.2->28.7  - Pain well controlled, continue current pain regimen  - Increase ambulation, SCDs when not ambulating  - Continue regular diet    sPEC  - Met criteria with postpartum headache not relieved by tylenol  - Currently receiving MgSO4 24 hours postpartum gtt (to be discontinued at 2pm today)  - BP monitoring, BP: 131/89 (03-07-24 @ 06:08) (124/81 - 140/87)  -HELLP wnl, P/C: 0.3  -Pt on [id number]  -Mild headache 3/10 this morning. Good resolution with Benadryl/Reglan however patient declines further Benadryl/Reglan due to drowsiness side effect.  -Patient attributing headache to sinus congestion, not relieved by saline spray. Order placed for Mucinex.  - Continue to monitor headaches.     Zaria Villareal, PGY1

## 2024-03-08 VITALS
HEART RATE: 82 BPM | RESPIRATION RATE: 18 BRPM | SYSTOLIC BLOOD PRESSURE: 120 MMHG | OXYGEN SATURATION: 99 % | DIASTOLIC BLOOD PRESSURE: 70 MMHG | TEMPERATURE: 98 F

## 2024-03-08 RX ORDER — NIFEDIPINE 30 MG
1 TABLET, EXTENDED RELEASE 24 HR ORAL
Qty: 30 | Refills: 0
Start: 2024-03-08

## 2024-03-08 RX ORDER — ALBUTEROL 90 UG/1
2 AEROSOL, METERED ORAL
Qty: 0 | Refills: 0 | DISCHARGE
Start: 2024-03-08

## 2024-03-08 RX ORDER — ASPIRIN/CALCIUM CARB/MAGNESIUM 324 MG
0 TABLET ORAL
Refills: 0 | DISCHARGE

## 2024-03-08 RX ORDER — FERROUS SULFATE 325(65) MG
0 TABLET ORAL
Refills: 0 | DISCHARGE

## 2024-03-08 RX ORDER — ACETAMINOPHEN 500 MG
3 TABLET ORAL
Qty: 0 | Refills: 0 | DISCHARGE
Start: 2024-03-08

## 2024-03-08 RX ORDER — SERTRALINE 25 MG/1
1 TABLET, FILM COATED ORAL
Qty: 0 | Refills: 0 | DISCHARGE

## 2024-03-08 RX ORDER — IBUPROFEN 200 MG
1 TABLET ORAL
Qty: 0 | Refills: 0 | DISCHARGE
Start: 2024-03-08

## 2024-03-08 RX ADMIN — SODIUM CHLORIDE 3 MILLILITER(S): 9 INJECTION INTRAMUSCULAR; INTRAVENOUS; SUBCUTANEOUS at 06:59

## 2024-03-08 RX ADMIN — Medication 600 MILLIGRAM(S): at 06:33

## 2024-03-08 RX ADMIN — Medication 600 MILLIGRAM(S): at 08:02

## 2024-03-08 RX ADMIN — Medication 600 MILLIGRAM(S): at 07:03

## 2024-03-08 RX ADMIN — Medication 975 MILLIGRAM(S): at 08:50

## 2024-03-08 RX ADMIN — Medication 975 MILLIGRAM(S): at 03:02

## 2024-03-08 RX ADMIN — Medication 600 MILLIGRAM(S): at 11:09

## 2024-03-08 RX ADMIN — Medication 975 MILLIGRAM(S): at 08:02

## 2024-03-08 RX ADMIN — Medication 975 MILLIGRAM(S): at 03:32

## 2024-03-08 RX ADMIN — Medication 600 MILLIGRAM(S): at 00:00

## 2024-03-08 NOTE — PROGRESS NOTE ADULT - ASSESSMENT
39yo PPD#2 s/p VAVD complicated by sPEC.  Patient is stable and doing well post-partum.    sPEC  - Met criteria with postpartum headache not relieved by tylenol  - s/p [id number] (3/6-7)  -cw BP monitoring, BP: 131/89 (03-07-24 @ 06:08) (124/81 - 140/87)  -HELLP wnl, P/C: 0.3  -Pt on [id number]  -Denies PEC symptoms  - continue to monitor vitals    #postpartum  - +320, lochia now WNL  - Hct: 31.2->28.7  - Pain well controlled, continue current pain regimen  - Increase ambulation, SCDs when not ambulating  - Continue regular diet    Marlyn Singletary PGY1 37yo PPD#2 s/p VAVD complicated by sPEC.  Patient is stable and doing well post-partum.    sPEC  - Met criteria with postpartum headache not relieved by tylenol  - s/p [id number] (3/6-7)  -cw BP monitoring, BP: 123/82 (03-08-24 @ 02:00) (119/85 - 131/89)  -HELLP wnl, P/C: 0.3  -Pt on [id number]  -Denies PEC symptoms  - continue to monitor vitals    #postpartum  - +320, lochia now WNL  - Hct: 31.2->28.7  - Pain well controlled, continue current pain regimen  - Increase ambulation, SCDs when not ambulating  - Continue regular diet    Marlyn Singletary PGY1

## 2024-03-08 NOTE — PROGRESS NOTE ADULT - SUBJECTIVE AND OBJECTIVE BOX
Patient seen and examined at bedside, no acute overnight events. No acute complaints, pain well controlled. Patient is ambulating, voiding spontaneously, passing gas, and tolerating regular diet. Denies CP, SOB, N/V, HA, blurred vision, epigastric pain.    Vital Signs Last 24 Hours  T(C): 37 (03-07-24 @ 22:29), Max: 37 (03-07-24 @ 22:29)  HR: 78 (03-08-24 @ 02:00) (74 - 99)  BP: 123/82 (03-08-24 @ 02:00) (119/85 - 131/89)  RR: 19 (03-08-24 @ 02:00) (17 - 19)  SpO2: 100% (03-08-24 @ 02:00) (96% - 100%)    Physical Exam:  General: NAD  Abdomen: Soft, non-tender, non-distended, fundus firm  Pelvic: Lochia wnl    Labs:    Blood Type: A Positive  Antibody Screen: Negative  RPR: Negative               9.8    12.88 )-----------( 183      ( 03-07 @ 05:45 )             28.7                10.7   11.94 )-----------( 156      ( 03-06 @ 04:30 )             31.2                10.9   10.93 )-----------( 174      ( 03-05 @ 23:15 )             31.1         MEDICATIONS  (STANDING):  acetaminophen     Tablet .. 975 milliGRAM(s) Oral <User Schedule>  diphtheria/tetanus/pertussis (acellular) Vaccine (Adacel) 0.5 milliLiter(s) IntraMuscular once  guaiFENesin  milliGRAM(s) Oral every 12 hours  ibuprofen  Tablet. 600 milliGRAM(s) Oral every 6 hours  lactated ringers. 1000 milliLiter(s) (50 mL/Hr) IV Continuous <Continuous>  NIFEdipine XL 30 milliGRAM(s) Oral daily  oxytocin Infusion 41.667 milliUNIT(s)/Min (125 mL/Hr) IV Continuous <Continuous>  prenatal multivitamin 1 Tablet(s) Oral daily  sertraline 100 milliGRAM(s) Oral daily  sodium chloride 0.9% lock flush 3 milliLiter(s) IV Push every 8 hours    MEDICATIONS  (PRN):  albuterol    90 MICROgram(s) HFA Inhaler 2 Puff(s) Inhalation every 6 hours PRN Shortness of Breath and/or Wheezing  benzocaine 20%/menthol 0.5% Spray 1 Spray(s) Topical every 6 hours PRN for Perineal discomfort  dibucaine 1% Ointment 1 Application(s) Topical every 6 hours PRN Perineal discomfort  diphenhydrAMINE 25 milliGRAM(s) Oral every 6 hours PRN Pruritus  hydrocortisone 1% Cream 1 Application(s) Topical every 6 hours PRN Moderate Pain (4-6)  lanolin Ointment 1 Application(s) Topical every 6 hours PRN nipple soreness  magnesium hydroxide Suspension 30 milliLiter(s) Oral two times a day PRN Constipation  oxyCODONE    IR 5 milliGRAM(s) Oral every 3 hours PRN Moderate to Severe Pain (4-10)  oxyCODONE    IR 5 milliGRAM(s) Oral once PRN Moderate to Severe Pain (4-10)  pramoxine 1%/zinc 5% Cream 1 Application(s) Topical every 4 hours PRN Moderate Pain (4-6)  simethicone 80 milliGRAM(s) Chew every 4 hours PRN Gas  witch hazel Pads 1 Application(s) Topical every 4 hours PRN Perineal discomfort      
OB Progress Note: VAVD PPD#1    S: 37yo  PPD#1 s/p VAVD. Last night, patient with 7/10 frontal headache that was treated with IV Benadryl and Reglan with good improvement. Currently reports 3/10 headache which she attributes to sinus congestion. Patient otherwise feels well. Pain is well controlled, tolerating regular diet, passing flatus, voiding spontaneously, ambulating without difficulty. Denies heavy vaginal bleeding, CP/SOB, N/V, lightheadedness/dizziness.  Denies blurry vision, RUQ pain, edema.    O:  Vitals:  Vital Signs Last 24 Hrs  T(C): 36.7 (07 Mar 2024 06:08), Max: 36.9 (06 Mar 2024 18:00)  T(F): 98.1 (07 Mar 2024 06:08), Max: 98.4 (06 Mar 2024 18:00)  HR: 82 (07 Mar 2024 06:08) (65 - 142)  BP: 131/89 (07 Mar 2024 06:08) (124/81 - 153/87)  BP(mean): --  RR: 18 (07 Mar 2024 06:08) (16 - 18)  SpO2: 98% (07 Mar 2024 06:08) (84% - 100%)    Parameters below as of 07 Mar 2024 06:08  Patient On (Oxygen Delivery Method): room air        MEDICATIONS  (STANDING):  acetaminophen     Tablet .. 975 milliGRAM(s) Oral <User Schedule>  diphtheria/tetanus/pertussis (acellular) Vaccine (Adacel) 0.5 milliLiter(s) IntraMuscular once  guaiFENesin  milliGRAM(s) Oral every 12 hours  ibuprofen  Tablet. 600 milliGRAM(s) Oral every 6 hours  lactated ringers. 1000 milliLiter(s) (50 mL/Hr) IV Continuous <Continuous>  magnesium sulfate Infusion 2 Gm/Hr (50 mL/Hr) IV Continuous <Continuous>  NIFEdipine XL 30 milliGRAM(s) Oral daily  oxytocin Infusion 41.667 milliUNIT(s)/Min (125 mL/Hr) IV Continuous <Continuous>  prenatal multivitamin 1 Tablet(s) Oral daily  sertraline 100 milliGRAM(s) Oral daily  sodium chloride 0.9% lock flush 3 milliLiter(s) IV Push every 8 hours      MEDICATIONS  (PRN):  albuterol    90 MICROgram(s) HFA Inhaler 2 Puff(s) Inhalation every 6 hours PRN Shortness of Breath and/or Wheezing  benzocaine 20%/menthol 0.5% Spray 1 Spray(s) Topical every 6 hours PRN for Perineal discomfort  dibucaine 1% Ointment 1 Application(s) Topical every 6 hours PRN Perineal discomfort  diphenhydrAMINE 25 milliGRAM(s) Oral every 6 hours PRN Pruritus  hydrocortisone 1% Cream 1 Application(s) Topical every 6 hours PRN Moderate Pain (4-6)  lanolin Ointment 1 Application(s) Topical every 6 hours PRN nipple soreness  magnesium hydroxide Suspension 30 milliLiter(s) Oral two times a day PRN Constipation  oxyCODONE    IR 5 milliGRAM(s) Oral every 3 hours PRN Moderate to Severe Pain (4-10)  oxyCODONE    IR 5 milliGRAM(s) Oral once PRN Moderate to Severe Pain (4-10)  pramoxine 1%/zinc 5% Cream 1 Application(s) Topical every 4 hours PRN Moderate Pain (4-6)  simethicone 80 milliGRAM(s) Chew every 4 hours PRN Gas  witch hazel Pads 1 Application(s) Topical every 4 hours PRN Perineal discomfort      Labs:  Blood type: A Positive  Rubella IgG: RPR: Negative                          9.8<L>   12.88<H> >-----------< 183    ( 03-07 @ 05:45 )             28.7<L>                        10.7<L>   11.94<H> >-----------< 156    ( 03-06 @ 04:30 )             31.2<L>                        10.9<L>   10.93<H> >-----------< 174    ( 03-05 @ 23:15 )             31.1<L>    03-07-24 @ 05:45      140  |  108<H>  |  7   ----------------------------<  104<H>  3.5   |  20<L>  |  0.80    03-06-24 @ 04:30      135  |  105  |  12  ----------------------------<  88  3.7   |  19<L>  |  0.71        Ca    6.7<L>      07 Mar 2024 05:45  Ca    8.3<L>      06 Mar 2024 04:30  Mg     5.90<H>     03-07  Mg     5.20<H>     03-06    TPro  5.0<L>  /  Alb  2.5<L>  /  TBili  <0.2  /  DBili  x   /  AST  25  /  ALT  9   /  AlkPhos  134<H>  03-07-24 @ 05:45  TPro  6.0  /  Alb  2.9<L>  /  TBili  0.2  /  DBili  x   /  AST  18  /  ALT  8   /  AlkPhos  166<H>  03-06-24 @ 04:30          Physical Exam:  General: NAD  HEENT: No sinus tenderness on palpation  Abdomen: soft, non-tender, non-distended, fundus firm  Vaginal: No heavy vaginal bleeding  Extremities: No erythema/edema

## 2024-03-08 NOTE — PROGRESS NOTE ADULT - ATTENDING COMMENTS
Pt seen and examined and agree with above assessment and plan.
Pt seen and examined and agree with above assessment and plan.  For d/c home.

## 2024-03-09 ENCOUNTER — EMERGENCY (EMERGENCY)
Facility: HOSPITAL | Age: 39
LOS: 1 days | Discharge: STILL ADMITTED OR ADMINISTRATIVE | End: 2024-03-09
Admitting: EMERGENCY MEDICINE
Payer: COMMERCIAL

## 2024-03-09 ENCOUNTER — TRANSCRIPTION ENCOUNTER (OUTPATIENT)
Age: 39
End: 2024-03-09

## 2024-03-09 ENCOUNTER — OUTPATIENT (OUTPATIENT)
Dept: INPATIENT UNIT | Facility: HOSPITAL | Age: 39
LOS: 1 days | Discharge: HOME | End: 2024-03-09
Payer: COMMERCIAL

## 2024-03-09 VITALS
TEMPERATURE: 98 F | OXYGEN SATURATION: 96 % | HEIGHT: 63 IN | DIASTOLIC BLOOD PRESSURE: 98 MMHG | WEIGHT: 149.91 LBS | SYSTOLIC BLOOD PRESSURE: 157 MMHG | RESPIRATION RATE: 16 BRPM | HEART RATE: 130 BPM

## 2024-03-09 VITALS — DIASTOLIC BLOOD PRESSURE: 97 MMHG | RESPIRATION RATE: 16 BRPM | SYSTOLIC BLOOD PRESSURE: 148 MMHG | HEART RATE: 127 BPM

## 2024-03-09 DIAGNOSIS — Z98.890 OTHER SPECIFIED POSTPROCEDURAL STATES: Chronic | ICD-10-CM

## 2024-03-09 DIAGNOSIS — K40.21 BILATERAL INGUINAL HERNIA, WITHOUT OBSTRUCTION OR GANGRENE, RECURRENT: Chronic | ICD-10-CM

## 2024-03-09 DIAGNOSIS — K08.409 PARTIAL LOSS OF TEETH, UNSPECIFIED CAUSE, UNSPECIFIED CLASS: Chronic | ICD-10-CM

## 2024-03-09 DIAGNOSIS — O26.899 OTHER SPECIFIED PREGNANCY RELATED CONDITIONS, UNSPECIFIED TRIMESTER: ICD-10-CM

## 2024-03-09 DIAGNOSIS — Z90.89 ACQUIRED ABSENCE OF OTHER ORGANS: Chronic | ICD-10-CM

## 2024-03-09 DIAGNOSIS — Z90.79 ACQUIRED ABSENCE OF OTHER GENITAL ORGAN(S): Chronic | ICD-10-CM

## 2024-03-09 LAB
ALBUMIN SERPL ELPH-MCNC: 3.2 G/DL — LOW (ref 3.3–5)
ALP SERPL-CCNC: 140 U/L — HIGH (ref 40–120)
ALT FLD-CCNC: 20 U/L — SIGNIFICANT CHANGE UP (ref 4–33)
ANION GAP SERPL CALC-SCNC: 13 MMOL/L — SIGNIFICANT CHANGE UP (ref 7–14)
APTT BLD: 29.5 SEC — SIGNIFICANT CHANGE UP (ref 24.5–35.6)
AST SERPL-CCNC: 33 U/L — HIGH (ref 4–32)
BASOPHILS # BLD AUTO: 0.03 K/UL — SIGNIFICANT CHANGE UP (ref 0–0.2)
BASOPHILS NFR BLD AUTO: 0.3 % — SIGNIFICANT CHANGE UP (ref 0–2)
BILIRUB SERPL-MCNC: 0.2 MG/DL — SIGNIFICANT CHANGE UP (ref 0.2–1.2)
BUN SERPL-MCNC: 6 MG/DL — LOW (ref 7–23)
CALCIUM SERPL-MCNC: 8.8 MG/DL — SIGNIFICANT CHANGE UP (ref 8.4–10.5)
CHLORIDE SERPL-SCNC: 109 MMOL/L — HIGH (ref 98–107)
CO2 SERPL-SCNC: 22 MMOL/L — SIGNIFICANT CHANGE UP (ref 22–31)
CREAT SERPL-MCNC: 0.54 MG/DL — SIGNIFICANT CHANGE UP (ref 0.5–1.3)
EGFR: 122 ML/MIN/1.73M2 — SIGNIFICANT CHANGE UP
EOSINOPHIL # BLD AUTO: 0.16 K/UL — SIGNIFICANT CHANGE UP (ref 0–0.5)
EOSINOPHIL NFR BLD AUTO: 1.5 % — SIGNIFICANT CHANGE UP (ref 0–6)
FIBRINOGEN PPP-MCNC: 450 MG/DL — SIGNIFICANT CHANGE UP (ref 200–465)
GLUCOSE SERPL-MCNC: 73 MG/DL — SIGNIFICANT CHANGE UP (ref 70–99)
HCT VFR BLD CALC: 32.4 % — LOW (ref 34.5–45)
HGB BLD-MCNC: 10.8 G/DL — LOW (ref 11.5–15.5)
IANC: 8.7 K/UL — HIGH (ref 1.8–7.4)
IMM GRANULOCYTES NFR BLD AUTO: 0.5 % — SIGNIFICANT CHANGE UP (ref 0–0.9)
INR BLD: <0.9 RATIO — SIGNIFICANT CHANGE UP (ref 0.85–1.18)
LDH SERPL L TO P-CCNC: 276 U/L — HIGH (ref 135–225)
LYMPHOCYTES # BLD AUTO: 1.51 K/UL — SIGNIFICANT CHANGE UP (ref 1–3.3)
LYMPHOCYTES # BLD AUTO: 13.8 % — SIGNIFICANT CHANGE UP (ref 13–44)
MCHC RBC-ENTMCNC: 29.8 PG — SIGNIFICANT CHANGE UP (ref 27–34)
MCHC RBC-ENTMCNC: 33.3 GM/DL — SIGNIFICANT CHANGE UP (ref 32–36)
MCV RBC AUTO: 89.3 FL — SIGNIFICANT CHANGE UP (ref 80–100)
MONOCYTES # BLD AUTO: 0.51 K/UL — SIGNIFICANT CHANGE UP (ref 0–0.9)
MONOCYTES NFR BLD AUTO: 4.6 % — SIGNIFICANT CHANGE UP (ref 2–14)
NEUTROPHILS # BLD AUTO: 8.7 K/UL — HIGH (ref 1.8–7.4)
NEUTROPHILS NFR BLD AUTO: 79.3 % — HIGH (ref 43–77)
NRBC # BLD: 0 /100 WBCS — SIGNIFICANT CHANGE UP (ref 0–0)
NRBC # FLD: 0 K/UL — SIGNIFICANT CHANGE UP (ref 0–0)
PLATELET # BLD AUTO: 255 K/UL — SIGNIFICANT CHANGE UP (ref 150–400)
POTASSIUM SERPL-MCNC: 3.6 MMOL/L — SIGNIFICANT CHANGE UP (ref 3.5–5.3)
POTASSIUM SERPL-SCNC: 3.6 MMOL/L — SIGNIFICANT CHANGE UP (ref 3.5–5.3)
PROT SERPL-MCNC: 6.5 G/DL — SIGNIFICANT CHANGE UP (ref 6–8.3)
PROTHROM AB SERPL-ACNC: 9.7 SEC — SIGNIFICANT CHANGE UP (ref 9.5–13)
RBC # BLD: 3.63 M/UL — LOW (ref 3.8–5.2)
RBC # FLD: 12.5 % — SIGNIFICANT CHANGE UP (ref 10.3–14.5)
SODIUM SERPL-SCNC: 144 MMOL/L — SIGNIFICANT CHANGE UP (ref 135–145)
URATE SERPL-MCNC: 4.3 MG/DL — SIGNIFICANT CHANGE UP (ref 2.5–7)
WBC # BLD: 10.97 K/UL — HIGH (ref 3.8–10.5)
WBC # FLD AUTO: 10.97 K/UL — HIGH (ref 3.8–10.5)

## 2024-03-09 PROCEDURE — 99214 OFFICE O/P EST MOD 30 MIN: CPT

## 2024-03-09 PROCEDURE — L9996: CPT

## 2024-03-09 RX ORDER — IBUPROFEN 200 MG
600 TABLET ORAL ONCE
Refills: 0 | Status: COMPLETED | OUTPATIENT
Start: 2024-03-09 | End: 2024-03-09

## 2024-03-09 RX ADMIN — Medication 600 MILLIGRAM(S): at 17:46

## 2024-03-09 NOTE — OB POSTPARTUM TRIAGE NOTE - ADDITIONAL INSTRUCTIONS
Call Monday morning for appointment this week   Continue Procardia XL 60mg daily  Will follow up with Neurologist for migraines  Signs and Symptoms of Pre Eclampsia reviewed   Return for severe range blood pressures >160/>110

## 2024-03-09 NOTE — OB POSTPARTUM TRIAGE NOTE - PLAN OF CARE
D/C Home  D/W Dr. Garza  No evidence of acute process  PP PEC  Call Monday morning for appointment this week   Continue Procardia XL 60mg daily  Will follow up with Neurologist for migraines  Signs and Symptoms of Pre Eclampsia reviewed   Return for severe range blood pressures >160/>110

## 2024-03-09 NOTE — OB POSTPARTUM TRIAGE NOTE - NSOBPROVIDERNOTE_OBGYN_ALL_OB_FT
CBC, CMP, and coags all sent  Motrin 600mg PO and coca-cola given to pt CBC, CMP, and coags all sent  Motrin 600mg PO and coffee given to pt CBC, CMP, and coags all sent  Motrin 600mg PO and coffee given to pt  Pt states improvement of headache  Report of care given to nightshift ACP MARLEN Nails NP for continuity of care CBC, CMP, and coags all sent  Motrin 600mg PO and coffee given to pt  Pt states improvement of headache  Report of care given to nightshift ACP MARLEN Nails NP for continuity of care    19:00 MARLEN Nails NP  Received patient  Awaiting HELLP labs   Serial BPs  20:00  Headache relieved, 0/10  HELLP labs WNL  20:20  Patient presented to Dr. Garza, will continue Procardia XL 60mg at home and follow up this week in office

## 2024-03-09 NOTE — OB POSTPARTUM TRIAGE NOTE - FALL HARM RISK - UNIVERSAL INTERVENTIONS
Bed in lowest position, wheels locked, appropriate side rails in place/Call bell, personal items and telephone in reach/Instruct patient to call for assistance before getting out of bed or chair/Non-slip footwear when patient is out of bed/[place] to call system/Physically safe environment - no spills, clutter or unnecessary equipment/Purposeful Proactive Rounding/Room/bathroom lighting operational, light cord in reach

## 2024-03-09 NOTE — ED ADULT TRIAGE NOTE - CHIEF COMPLAINT QUOTE
Pt states she gave vaginal birth on wednesday and was on a drip for preeclampsia.  Pt discharged yesterday and noted elevated BP.  Her OB/GYN increased her dose of BP medicaiton but BP is still elevated.  Pt denies SOB, CP.  Pt states she feel like her tongue is slightly swollen and pain to eyes.  Pt able to speak in full sentences.

## 2024-03-09 NOTE — OB POSTPARTUM TRIAGE NOTE - NSHPPHYSICALEXAM_GEN_ALL_CORE
A&O x3  NAD A&O x3  NAD  lungs: clear bilaterally  heart: regular rate and rhythm  extremities: no edema present bilaterally    BP's here in triage: 132/95, 138/94, 131/87, 142/86, 130/83, 135/100  HR: 100-110bpm

## 2024-03-10 ENCOUNTER — NON-APPOINTMENT (OUTPATIENT)
Age: 39
End: 2024-03-10

## 2024-03-11 ENCOUNTER — NON-APPOINTMENT (OUTPATIENT)
Age: 39
End: 2024-03-11

## 2024-03-12 ENCOUNTER — APPOINTMENT (OUTPATIENT)
Dept: OBGYN | Facility: CLINIC | Age: 39
End: 2024-03-12
Payer: COMMERCIAL

## 2024-03-12 VITALS
BODY MASS INDEX: 23.57 KG/M2 | SYSTOLIC BLOOD PRESSURE: 130 MMHG | HEIGHT: 63 IN | DIASTOLIC BLOOD PRESSURE: 90 MMHG | WEIGHT: 133 LBS

## 2024-03-12 DIAGNOSIS — O09.529 SUPERVISION OF ELDERLY MULTIGRAVIDA, UNSPECIFIED TRIMESTER: ICD-10-CM

## 2024-03-12 DIAGNOSIS — Z3A.38 38 WEEKS GESTATION OF PREGNANCY: ICD-10-CM

## 2024-03-12 DIAGNOSIS — O36.80X0 PREGNANCY WITH INCONCLUSIVE FETAL VIABILITY, NOT APPLICABLE OR UNSPECIFIED: ICD-10-CM

## 2024-03-12 DIAGNOSIS — O99.810 ABNORMAL GLUCOSE COMPLICATING PREGNANCY: ICD-10-CM

## 2024-03-12 DIAGNOSIS — O09.819 SUPERVISION OF PREGNANCY RESULTING FROM ASSISTED REPRODUCTIVE TECHNOLOGY, UNSPECIFIED TRIMESTER: ICD-10-CM

## 2024-03-12 DIAGNOSIS — R12 OTHER SPECIFIED PREGNANCY RELATED CONDITIONS, UNSPECIFIED TRIMESTER: ICD-10-CM

## 2024-03-12 DIAGNOSIS — Z3A.21 21 WEEKS GESTATION OF PREGNANCY: ICD-10-CM

## 2024-03-12 DIAGNOSIS — O26.899 OTHER SPECIFIED PREGNANCY RELATED CONDITIONS, UNSPECIFIED TRIMESTER: ICD-10-CM

## 2024-03-12 DIAGNOSIS — Z3A.11 11 WEEKS GESTATION OF PREGNANCY: ICD-10-CM

## 2024-03-12 DIAGNOSIS — Z3A.09 9 WEEKS GESTATION OF PREGNANCY: ICD-10-CM

## 2024-03-12 DIAGNOSIS — O99.019 ANEMIA COMPLICATING PREGNANCY, UNSPECIFIED TRIMESTER: ICD-10-CM

## 2024-03-12 DIAGNOSIS — Z23 ENCOUNTER FOR IMMUNIZATION: ICD-10-CM

## 2024-03-12 DIAGNOSIS — Z3A.24 24 WEEKS GESTATION OF PREGNANCY: ICD-10-CM

## 2024-03-12 DIAGNOSIS — Z78.9 OTHER SPECIFIED HEALTH STATUS: ICD-10-CM

## 2024-03-12 DIAGNOSIS — Z3A.28 28 WEEKS GESTATION OF PREGNANCY: ICD-10-CM

## 2024-03-12 DIAGNOSIS — O99.619 DISEASES OF THE DIGESTIVE SYSTEM COMPLICATING PREGNANCY, UNSPECIFIED TRIMESTER: ICD-10-CM

## 2024-03-12 DIAGNOSIS — Z3A.33 33 WEEKS GESTATION OF PREGNANCY: ICD-10-CM

## 2024-03-12 DIAGNOSIS — Z3A.15 15 WEEKS GESTATION OF PREGNANCY: ICD-10-CM

## 2024-03-12 DIAGNOSIS — Z3A.37 37 WEEKS GESTATION OF PREGNANCY: ICD-10-CM

## 2024-03-12 DIAGNOSIS — Z3A.30 30 WEEKS GESTATION OF PREGNANCY: ICD-10-CM

## 2024-03-12 DIAGNOSIS — K21.9 DISEASES OF THE DIGESTIVE SYSTEM COMPLICATING PREGNANCY, UNSPECIFIED TRIMESTER: ICD-10-CM

## 2024-03-12 DIAGNOSIS — O21.9 VOMITING OF PREGNANCY, UNSPECIFIED: ICD-10-CM

## 2024-03-12 PROCEDURE — 0503F POSTPARTUM CARE VISIT: CPT

## 2024-03-12 RX ORDER — DOCUSATE SODIUM 100 MG/1
100 CAPSULE, LIQUID FILLED ORAL TWICE DAILY
Qty: 1 | Refills: 5 | Status: DISCONTINUED | COMMUNITY
Start: 2023-11-27 | End: 2024-03-12

## 2024-03-12 RX ORDER — PANTOPRAZOLE 40 MG/1
40 TABLET, DELAYED RELEASE ORAL TWICE DAILY
Qty: 60 | Refills: 0 | Status: DISCONTINUED | COMMUNITY
Start: 2023-10-02 | End: 2024-03-12

## 2024-03-12 RX ORDER — ONDANSETRON 4 MG/1
4 TABLET ORAL
Qty: 30 | Refills: 0 | Status: DISCONTINUED | COMMUNITY
Start: 2023-08-08 | End: 2024-03-12

## 2024-03-12 RX ORDER — SERTRALINE HYDROCHLORIDE 100 MG/1
100 TABLET, FILM COATED ORAL DAILY
Qty: 1 | Refills: 1 | Status: ACTIVE | COMMUNITY
Start: 2024-01-26 | End: 1900-01-01

## 2024-03-12 RX ORDER — DOXYLAMINE SUCCINATE AND PYRIDOXINE HYDROCHLORIDE 10; 10 MG/1; MG/1
10-10 TABLET, DELAYED RELEASE ORAL TWICE DAILY
Qty: 60 | Refills: 5 | Status: DISCONTINUED | COMMUNITY
Start: 2023-08-08 | End: 2024-03-12

## 2024-03-12 RX ORDER — CHLORHEXIDINE GLUCONATE 4 %
325 (65 FE) LIQUID (ML) TOPICAL TWICE DAILY
Qty: 60 | Refills: 6 | Status: DISCONTINUED | COMMUNITY
Start: 2023-11-27 | End: 2024-03-12

## 2024-03-12 RX ORDER — LORAZEPAM 1 MG/1
1 TABLET ORAL
Refills: 0 | Status: DISCONTINUED | COMMUNITY
Start: 2021-08-20 | End: 2024-03-12

## 2024-03-12 NOTE — HISTORY OF PRESENT ILLNESS
[Postpartum Follow Up] : postpartum follow up [Complications:___] : complications include: [unfilled] [] : delivered by vaginal delivery [Delivery Date: ___] : on [unfilled] [Breastfeeding] : currently nursing [S/Sx PP Depression] : no signs/symptoms of postpartum depression [None] : No associated symptoms are reported [Back to Normal] : is back to normal in size [Mild] : mild vaginal bleeding [Normal] : the vagina was normal [Cervix Sample Taken] : cervical sample not taken for a Pap smear [Not Done] : Examination of breasts not done [Doing Well] : is doing well [No Sign of Infection] : is showing no signs of infection [Excellent Pain Control] : has excellent pain control [No [place]] : to avoid sexual intercourse [Limited ADLs] : to participate in activities of daily living with limitations [Limited Work] : to work with limitations [Limited Housework] : to do housework with limitations [Limited Sports___] : to participate in sports with limitations~U: [unfilled] [de-identified] : continue procardia 60 er

## 2024-03-18 ENCOUNTER — NON-APPOINTMENT (OUTPATIENT)
Age: 39
End: 2024-03-18

## 2024-03-21 ENCOUNTER — NON-APPOINTMENT (OUTPATIENT)
Age: 39
End: 2024-03-21

## 2024-03-22 ENCOUNTER — APPOINTMENT (OUTPATIENT)
Dept: CARDIOLOGY | Facility: CLINIC | Age: 39
End: 2024-03-22
Payer: COMMERCIAL

## 2024-03-22 ENCOUNTER — NON-APPOINTMENT (OUTPATIENT)
Age: 39
End: 2024-03-22

## 2024-03-22 VITALS
WEIGHT: 133 LBS | OXYGEN SATURATION: 98 % | SYSTOLIC BLOOD PRESSURE: 136 MMHG | HEIGHT: 63 IN | DIASTOLIC BLOOD PRESSURE: 89 MMHG | HEART RATE: 99 BPM | BODY MASS INDEX: 23.57 KG/M2

## 2024-03-22 PROCEDURE — 93000 ELECTROCARDIOGRAM COMPLETE: CPT

## 2024-03-22 PROCEDURE — 99204 OFFICE O/P NEW MOD 45 MIN: CPT | Mod: 25

## 2024-03-22 RX ORDER — NIFEDIPINE 30 MG/1
30 TABLET, EXTENDED RELEASE ORAL DAILY
Qty: 60 | Refills: 0 | Status: ACTIVE | COMMUNITY
Start: 2024-03-12 | End: 1900-01-01

## 2024-03-22 NOTE — HISTORY OF PRESENT ILLNESS
[FreeTextEntry1] : 38 year old woman  who delivered on 3/6/24 at 39.2 weeks complicated by PEC. Pregnancy history: TOP x 1 SAB x 1 Ectopic x1 with salpingectomy in  PPROM in  at 36 weeks with anxiety  Now on Nifedipine 60 mg daily Was readmitted postpartum 2 days after delivery

## 2024-03-22 NOTE — DISCUSSION/SUMMARY
[EKG obtained to assist in diagnosis and management of assessed problem(s)] : EKG obtained to assist in diagnosis and management of assessed problem(s) [FreeTextEntry1] : 38 year old woman  who delivered on 3/6/24 at 39.2 weeks complicated by PEC. Now on Nifedipine 60 Will continue for 1 more week and email results Will anticipate decrease to 30 mg daily and then wean FU in 4-6 weeks

## 2024-03-26 ENCOUNTER — NON-APPOINTMENT (OUTPATIENT)
Age: 39
End: 2024-03-26

## 2024-04-03 ENCOUNTER — NON-APPOINTMENT (OUTPATIENT)
Age: 39
End: 2024-04-03

## 2024-04-16 ENCOUNTER — APPOINTMENT (OUTPATIENT)
Dept: OBGYN | Facility: CLINIC | Age: 39
End: 2024-04-16
Payer: COMMERCIAL

## 2024-04-16 VITALS
DIASTOLIC BLOOD PRESSURE: 86 MMHG | BODY MASS INDEX: 21.97 KG/M2 | SYSTOLIC BLOOD PRESSURE: 124 MMHG | HEIGHT: 63 IN | WEIGHT: 124 LBS

## 2024-04-16 DIAGNOSIS — Z30.09 ENCOUNTER FOR OTHER GENERAL COUNSELING AND ADVICE ON CONTRACEPTION: ICD-10-CM

## 2024-04-16 PROCEDURE — 0503F POSTPARTUM CARE VISIT: CPT

## 2024-04-16 RX ORDER — NORETHINDRONE 0.35 MG/1
0.35 TABLET ORAL
Qty: 1 | Refills: 0 | Status: ACTIVE | COMMUNITY
Start: 2024-04-16 | End: 1900-01-01

## 2024-04-16 RX ORDER — NORETHINDRONE 0.35 MG/1
0.35 TABLET ORAL DAILY
Qty: 3 | Refills: 0 | Status: ACTIVE | COMMUNITY
Start: 2024-04-16 | End: 1900-01-01

## 2024-04-16 NOTE — HISTORY OF PRESENT ILLNESS
[Postpartum Follow Up] : postpartum follow up [Complications:___] : complications include: [unfilled] [] : delivered by vaginal delivery [Breastfeeding] : not currently nursing [S/Sx PP Depression] : no signs/symptoms of postpartum depression [Back to Normal] : is back to normal in size [Mild] : mild vaginal bleeding [Normal] : the vagina was normal [Cervix Sample Taken] : cervical sample not taken for a Pap smear [Not Done] : Examination of breasts not done [Doing Well] : is doing well [No Sign of Infection] : is showing no signs of infection [Excellent Pain Control] : has excellent pain control [None] : None [de-identified] : start minipilll for birth control, taper procardia to 30 QD

## 2024-05-10 NOTE — DISCUSSION/SUMMARY
[FreeTextEntry1] : 38 year old woman  who delivered on 3/6/24 at 39.2 weeks complicated by PEC. Now on Nifedipine 60 Will continue for 1 more week and email results Will anticipate decrease to 30 mg daily and then wean FU in 4-6 weeks

## 2024-05-14 ENCOUNTER — APPOINTMENT (OUTPATIENT)
Dept: CARDIOLOGY | Facility: CLINIC | Age: 39
End: 2024-05-14

## 2024-07-16 ENCOUNTER — APPOINTMENT (OUTPATIENT)
Dept: OBGYN | Facility: CLINIC | Age: 39
End: 2024-07-16
Payer: COMMERCIAL

## 2024-07-16 VITALS
DIASTOLIC BLOOD PRESSURE: 78 MMHG | HEIGHT: 63 IN | WEIGHT: 130 LBS | SYSTOLIC BLOOD PRESSURE: 118 MMHG | BODY MASS INDEX: 23.04 KG/M2

## 2024-07-16 DIAGNOSIS — F41.9 ANXIETY DISORDER, UNSPECIFIED: ICD-10-CM

## 2024-07-16 DIAGNOSIS — Z30.09 ENCOUNTER FOR OTHER GENERAL COUNSELING AND ADVICE ON CONTRACEPTION: ICD-10-CM

## 2024-07-16 DIAGNOSIS — Z01.419 ENCOUNTER FOR GYNECOLOGICAL EXAMINATION (GENERAL) (ROUTINE) W/OUT ABNORMAL FINDINGS: ICD-10-CM

## 2024-07-16 DIAGNOSIS — F32.A ANXIETY DISORDER, UNSPECIFIED: ICD-10-CM

## 2024-07-16 DIAGNOSIS — A60.00 HERPESVIRAL INFECTION OF UROGENITAL SYSTEM, UNSPECIFIED: ICD-10-CM

## 2024-07-16 PROCEDURE — 99395 PREV VISIT EST AGE 18-39: CPT

## 2024-07-16 PROCEDURE — 99459 PELVIC EXAMINATION: CPT

## 2024-07-17 LAB — HPV HIGH+LOW RISK DNA PNL CVX: NOT DETECTED

## 2024-07-22 LAB — CYTOLOGY CVX/VAG DOC THIN PREP: NORMAL

## 2024-11-10 ENCOUNTER — NON-APPOINTMENT (OUTPATIENT)
Age: 39
End: 2024-11-10

## 2024-12-23 ENCOUNTER — NON-APPOINTMENT (OUTPATIENT)
Age: 39
End: 2024-12-23

## 2025-01-24 NOTE — OB PROVIDER TRIAGE NOTE - [facility], OB PROFILE
Writer called [facility] for pre-op orders. Office will fax H&P, labs and ECG tracings to office.     Writer called [facility] and spoke to her mother, Nicki. Pre-op abx sent to preferred pharmacy. No questions regarding prep at this time.   
2

## 2025-02-10 ENCOUNTER — NON-APPOINTMENT (OUTPATIENT)
Age: 40
End: 2025-02-10

## 2025-03-17 RX ORDER — NORETHINDRONE 0.35 MG/1
0.35 TABLET ORAL
Qty: 2 | Refills: 0 | Status: ACTIVE | COMMUNITY
Start: 2025-03-14 | End: 1900-01-01

## 2025-06-09 ENCOUNTER — NON-APPOINTMENT (OUTPATIENT)
Age: 40
End: 2025-06-09

## 2025-07-02 PROBLEM — Z32.01 POSITIVE URINE PREGNANCY TEST: Status: RESOLVED | Noted: 2019-03-05 | Resolved: 2025-07-02

## 2025-07-29 ENCOUNTER — APPOINTMENT (OUTPATIENT)
Dept: OBGYN | Facility: CLINIC | Age: 40
End: 2025-07-29
Payer: COMMERCIAL

## 2025-07-29 ENCOUNTER — NON-APPOINTMENT (OUTPATIENT)
Age: 40
End: 2025-07-29

## 2025-07-29 VITALS
SYSTOLIC BLOOD PRESSURE: 110 MMHG | WEIGHT: 117 LBS | BODY MASS INDEX: 20.73 KG/M2 | HEIGHT: 63 IN | DIASTOLIC BLOOD PRESSURE: 68 MMHG

## 2025-07-29 DIAGNOSIS — Z11.3 ENCOUNTER FOR SCREENING FOR INFECTIONS WITH A PREDOMINANTLY SEXUAL MODE OF TRANSMISSION: ICD-10-CM

## 2025-07-29 DIAGNOSIS — Z01.419 ENCOUNTER FOR GYNECOLOGICAL EXAMINATION (GENERAL) (ROUTINE) W/OUT ABNORMAL FINDINGS: ICD-10-CM

## 2025-07-29 DIAGNOSIS — A60.00 HERPESVIRAL INFECTION OF UROGENITAL SYSTEM, UNSPECIFIED: ICD-10-CM

## 2025-07-29 DIAGNOSIS — Z30.09 ENCOUNTER FOR OTHER GENERAL COUNSELING AND ADVICE ON CONTRACEPTION: ICD-10-CM

## 2025-07-29 PROCEDURE — 99459 PELVIC EXAMINATION: CPT | Mod: NC

## 2025-07-29 PROCEDURE — 99395 PREV VISIT EST AGE 18-39: CPT

## 2025-07-29 RX ORDER — DIAZEPAM 5 MG/1
5 TABLET ORAL
Refills: 0 | Status: ACTIVE | COMMUNITY
Start: 2025-07-29

## 2025-07-29 RX ORDER — LAMOTRIGINE 100 MG/1
100 TABLET ORAL TWICE DAILY
Refills: 0 | Status: ACTIVE | COMMUNITY
Start: 2025-07-29

## 2025-07-31 LAB
SOURCE AMPLIFICATION: NORMAL
T VAGINALIS RRNA SPEC QL NAA+PROBE: NOT DETECTED

## 2025-08-01 LAB
C TRACH RRNA SPEC QL NAA+PROBE: NOT DETECTED
CYTOLOGY CVX/VAG DOC THIN PREP: NORMAL
HPV HIGH+LOW RISK DNA PNL CVX: NOT DETECTED
N GONORRHOEA RRNA SPEC QL NAA+PROBE: NOT DETECTED
SOURCE TP AMPLIFICATION: NORMAL

## (undated) DEVICE — TUBING INSUFFLATION LAP FILTER 10FT

## (undated) DEVICE — UTERINE MANIPULATOR CONMED VCARE SM 32MM

## (undated) DEVICE — DRSG DERMABOND 0.7ML

## (undated) DEVICE — TROCAR COVIDIEN VERSAPORT BLADELESS OPTICAL 5MM STANDARD

## (undated) DEVICE — TUBING SUCTION NONCONDUCTIVE 6MM X 12FT

## (undated) DEVICE — GLV 6.5 PROTEXIS (CREAM) MICRO

## (undated) DEVICE — ELCTR DISSECTOR ULTRASONIC CORDLESS CVD JAW 5MM-39CM

## (undated) DEVICE — SOL IRR POUR H2O 500ML

## (undated) DEVICE — PACK PERI GYN

## (undated) DEVICE — TUBING HYDRO-SURG PLUS IRRIGATOR W SMOKEVAC & PROBE

## (undated) DEVICE — ENDOCATCH 10MM SPECIMEN POUCH

## (undated) DEVICE — POSITIONER FOAM EGG CRATE ULNAR 2PCS (PINK)

## (undated) DEVICE — PACK D&C

## (undated) DEVICE — SOL IRR BAG NS 0.9% 3000ML

## (undated) DEVICE — DRSG TEGADERM 2.5X3"

## (undated) DEVICE — DRSG KERLIX ROLL 4.5"

## (undated) DEVICE — DRSG PAD SANITARY OB

## (undated) DEVICE — SOL IRR POUR NS 0.9% 500ML

## (undated) DEVICE — SUT VICRYL PLUS 0 18" CT-1 (POP-OFF)

## (undated) DEVICE — PROTECTOR HEEL / ELBOW FLUFFY

## (undated) DEVICE — TIP METZENBAUM SCISSOR MONOPOLAR ENDOCUT (ORANGE)

## (undated) DEVICE — SUT VICRYL 0 27" UR-6

## (undated) DEVICE — UTERINE MANIPULATOR COOPER SURGICAL 5MM 33CM GREEN

## (undated) DEVICE — POSITIONER PINK PAD PIGAZZI SYSTEM

## (undated) DEVICE — LIGASURE BLUNT TIP 37CM

## (undated) DEVICE — PACK GENERAL LAPAROSCOPY

## (undated) DEVICE — POSITIONER STRAP ARMBOARD VELCRO TS-30

## (undated) DEVICE — BASIN SET SINGLE

## (undated) DEVICE — SCOPE WARMER SEAL DISP

## (undated) DEVICE — TUBING OLYMPUS INSUFFLATION

## (undated) DEVICE — DRSG STERISTRIPS 0.5 X 4"

## (undated) DEVICE — BLADE SURGICAL #15 CARBON

## (undated) DEVICE — SUT MONOCRYL 4-0 27" PS-2 UNDYED

## (undated) DEVICE — WARMING BLANKET FULL ADULT

## (undated) DEVICE — SUT VICRYL 0 27" CT-1 UNDYED

## (undated) DEVICE — TROCAR COVIDIEN VERSAONE BLUNT TIP HASSAN 12MM

## (undated) DEVICE — DRSG TELFA 3 X 8

## (undated) DEVICE — PREP BETADINE SPONGE STICKS

## (undated) DEVICE — VENODYNE/SCD SLEEVE CALF MEDIUM

## (undated) DEVICE — FOLEY TRAY 16FR 5CC LF UMETER CLOSED

## (undated) DEVICE — GLV 7 PROTEXIS (CREAM) MICRO